# Patient Record
Sex: MALE | Race: WHITE | Employment: FULL TIME | URBAN - METROPOLITAN AREA
[De-identification: names, ages, dates, MRNs, and addresses within clinical notes are randomized per-mention and may not be internally consistent; named-entity substitution may affect disease eponyms.]

---

## 2017-03-06 ENCOUNTER — OFFICE VISIT (OUTPATIENT)
Dept: ENDOCRINOLOGY | Age: 62
End: 2017-03-06

## 2017-03-06 VITALS
WEIGHT: 174 LBS | DIASTOLIC BLOOD PRESSURE: 62 MMHG | BODY MASS INDEX: 24.91 KG/M2 | HEART RATE: 53 BPM | SYSTOLIC BLOOD PRESSURE: 104 MMHG | HEIGHT: 70 IN

## 2017-03-06 DIAGNOSIS — E11.9 TYPE 2 DIABETES MELLITUS WITHOUT COMPLICATION, WITHOUT LONG-TERM CURRENT USE OF INSULIN (HCC): Primary | ICD-10-CM

## 2017-03-06 DIAGNOSIS — E78.2 MIXED HYPERLIPIDEMIA: ICD-10-CM

## 2017-03-06 RX ORDER — OSELTAMIVIR PHOSPHATE 75 MG/1
CAPSULE ORAL
Refills: 0 | COMMUNITY
Start: 2017-02-07 | End: 2017-03-06 | Stop reason: ALTCHOICE

## 2017-03-06 NOTE — PROGRESS NOTES
Chief Complaint   Patient presents with    Diabetes     pcp and pharmacy verified. Release signed for eye exam     History of Present Illness: Darlene Vargas is a 57 y.o. male here for follow up of diabetes. He was diagnosed with diabetes around 2009. At his last appointment in September 2016 his A1C was 6.5% on Januvia 100 mg daily Metformin 500mg with breakfast and lunch 1000mg with dinner. He notes he has been taking his Lipitor with his Metformin at night. Pt notes his BGs are running in the  range. Pt is eating 2-3 meals daily. Pt has breakfast on the weekend only typically a breakfast sandwich. He has lunch around [place], today he had a tossed salad and water. Dinner last night was pork tenerloin, tossed salad, cottage cheese and potatoes and sweet tea. He denies snacking between meals or evening snacking. His last exam was in July 2016, no retinopathy (report reviewed, see scanned document). He has an appointment in the near future. Exercise consists of housework. No history of vascular disease. No history of retinopathy, neuropathy or nephropathy. Pt is tolerating the Atorvastatin 10mg daily (which was started in October of 2014) with no issues. Pt notes he had the flu about 3 weeks ago. Current Outpatient Prescriptions   Medication Sig    sitaGLIPtin (JANUVIA) 100 mg tablet TAKE 1 TABLET BY MOUTH DAILY. TO HELP LOWER BLOOD SUGAR    metFORMIN (GLUCOPHAGE) 500 mg tablet TAKE ONE TABLET WITH BREAKFAST, ONE TABLET WITH LUNCH AND TWO TABLETS WITH DINNER INDICATIONS: TYPE 2 DIABETES MELLITUS    atorvastatin (LIPITOR) 10 mg tablet TAKE 1 TABLET BY MOUTH DAILY. IN THE EVENING TO HELP LOWER CHOLESTEROL     No current facility-administered medications for this visit.       Allergies   Allergen Reactions    Codeine Other (comments)     Sweating     Review of Systems:  - Eyes: no blurry vision or double vision  - Cardiovascular: no chest pain  - Respiratory: no shortness of breath  - Musculoskeletal: no myalgias  - Neurological: no numbness/tingling in extremities    Physical Examination:  Blood pressure 104/62, pulse (!) 53, height 5' 10\" (1.778 m), weight 174 lb (78.9 kg). - General: pleasant, no distress, good eye contact   - Neck: no carotid bruits  - Cardiovascular: regular, normal rate, nl s1 and s2, no m/r/g, 2+ DP pulses   - Respiratory: clear bilaterally  - Integumentary: no edema, no foot ulcers, sensation to monofilament and vibration intact bilaterally  - Psychiatric: normal mood and affect    Data Reviewed:   - none new for review    Assessment/Plan:   1) DM > His blood sugars are doing very well, ranging between 's. Pt encouraged to keep up the good work. Will check his A1C today. His BP is at goal on no medications. 2) HLD > Pt is tolerating his atorvastatin 10mg well. Will check a lipid panel today. Pt voices understanding and agreement with the plan. RTC 3 months      Follow-up Disposition:  Return in about 6 months (around 9/6/2017).     Copy sent to:  Marisa Martínez

## 2017-03-06 NOTE — MR AVS SNAPSHOT
Visit Information Date & Time Provider Department Dept. Phone Encounter #  
 3/6/2017  4:10 PM Pascual Weeks, [address] [facility] Diabetes and Endocrinology [phone] [id number] Follow-up Instructions Return in about 6 months (around 9/6/2017). Upcoming Health Maintenance Date Due Hepatitis C Screening 1955 Pneumococcal 19-64 Medium Risk (1 of 1 - PPSV23) 6/29/1973 DTaP/Tdap/Td series (1 - Tdap) 6/29/1975 FOBT Q 1 YEAR AGE 50-75 6/29/2004 ZOSTER VACCINE AGE 60> 6/29/2014 EYE EXAM RETINAL OR DILATED Q1 3/27/2016 LIPID PANEL Q1 6/11/2016 INFLUENZA AGE 9 TO ADULT 8/1/2016 HEMOGLOBIN A1C Q6M 3/6/2017 MICROALBUMIN Q1 3/9/2017 FOOT EXAM Q1 9/6/2017 Allergies as of 3/6/2017  Review Complete On: 3/6/2017 By: Pascual Weeks MD  
  
 Severity Noted Reaction Type Reactions Codeine  02/22/2010    Other (comments) Sweating Current Immunizations  Never Reviewed No immunizations on file. Not reviewed this visit You Were Diagnosed With   
  
 Codes Comments Type 2 diabetes mellitus without complication, without long-term current use of insulin (HCC)    -  Primary ICD-10-CM: E11.9 ICD-9-CM: 250.00 Mixed hyperlipidemia     ICD-10-CM: E78.2 ICD-9-CM: 272.2 Vitals BP Pulse Height(growth percentile) Weight(growth percentile) BMI Smoking Status 104/62 (BP 1 Location: Left arm, BP Patient Position: Sitting) (!) 53 5' 10\" (1.778 m) 174 lb (78.9 kg) 24.97 kg/m2 Former Smoker Vitals History BMI and BSA Data Body Mass Index Body Surface Area 24.97 kg/m 2 1.97 m 2 Preferred Pharmacy Pharmacy Name Phone [facility] [phone] Your Updated Medication List  
  
   
This list is accurate as of: 3/6/17  4:29 PM.  Always use your most recent med list.  
  
  
  
  
 atorvastatin 10 mg tablet Commonly known as:  LIPITOR TAKE 1 TABLET BY MOUTH DAILY. IN THE EVENING TO HELP LOWER CHOLESTEROL  
  
 metFORMIN 500 mg tablet Commonly known as:  GLUCOPHAGE  
TAKE ONE TABLET WITH BREAKFAST, ONE TABLET WITH LUNCH AND TWO TABLETS WITH DINNER INDICATIONS: TYPE 2 DIABETES MELLITUS SITagliptin 100 mg tablet Commonly known as:  Carole Bannister TAKE 1 TABLET BY MOUTH DAILY. TO HELP LOWER BLOOD SUGAR We Performed the Following HEMOGLOBIN A1C WITH EAG [62615 CPT(R)] LDL, DIRECT [id number] CPT(R)] LIPID PANEL [75491 CPT(R)] METABOLIC PANEL, COMPREHENSIVE [12532 CPT(R)] MICROALBUMIN, UR, RAND W/ MICROALBUMIN/CREA RATIO [id number] CPT(R)] PA COLLECTION VENOUS BLOOD,VENIPUNCTURE [id number] CPT(R)] PA HANDLG&/OR CONVEY OF SPEC FOR TR OFFICE TO LAB [62895 CPT(R)] Follow-up Instructions Return in about 6 months (around 9/6/2017). Introducing hospitals & HEALTH SERVICES! Chantel Zaragoza introduces NoteVault patient portal. Now you can access parts of your medical record, email your doctor's office, and request medication refills online. 1. In your internet browser, go to [email or web address] 2. Click on the First Time User? Click Here link in the Sign In box. You will see the New Member Sign Up page. 3. Enter your NoteVault Access Code exactly as it appears below. You will not need to use this code after youve completed the sign-up process. If you do not sign up before the expiration date, you must request a new code. · NoteVault Access Code: [email or web address] Expires: 6/4/2017  4:02 PM 
 
4. Enter the last four digits of your Social Security Number (xxxx) and Date of Birth (mm/dd/yyyy) as indicated and click Submit. You will be taken to the next sign-up page. 5. Create a [email or web address].iot ID. This will be your NoteVault login ID and cannot be changed, so think of one that is secure and easy to remember. 6. Create a NoteVault password. You can change your password at any time. 7. Enter your Password Reset Question and Answer. This can be used at a later time if you forget your password. 8. Enter your e-mail address. You will receive e-mail notification when new information is available in [address] 9. Click Sign Up. You can now view and download portions of your medical record. 10. Click the Download Summary menu link to download a portable copy of your medical information. If you have questions, please visit the Frequently Asked Questions section of the Nimbuzz website. Remember, Nimbuzz is NOT to be used for urgent needs. For medical emergencies, dial 911. Now available from your iPhone and Android! Please provide this summary of care documentation to your next provider. Your primary care clinician is listed as Vero Hernadez. If you have any questions after today's visit, please call [phone].

## 2017-03-07 LAB
ALBUMIN SERPL-MCNC: 4.3 G/DL (ref 3.6–4.8)
ALBUMIN/CREAT UR: 3.4 MG/G CREAT (ref 0–30)
ALBUMIN/GLOB SERPL: 2 {RATIO} (ref 1.1–2.5)
ALP SERPL-CCNC: 63 IU/L (ref 39–117)
ALT SERPL-CCNC: 19 IU/L (ref 0–44)
AST SERPL-CCNC: 17 IU/L (ref 0–40)
BILIRUB SERPL-MCNC: 0.2 MG/DL (ref 0–1.2)
BUN SERPL-MCNC: 17 MG/DL (ref 8–27)
BUN/CREAT SERPL: 18 (ref 10–22)
CALCIUM SERPL-MCNC: 9.2 MG/DL (ref 8.6–10.2)
CHLORIDE SERPL-SCNC: 104 MMOL/L (ref 96–106)
CHOLEST SERPL-MCNC: 129 MG/DL (ref 100–199)
CO2 SERPL-SCNC: 26 MMOL/L (ref 18–29)
CREAT SERPL-MCNC: 0.93 MG/DL (ref 0.76–1.27)
CREAT UR-MCNC: 127.8 MG/DL
EST. AVERAGE GLUCOSE BLD GHB EST-MCNC: 143 MG/DL
GLOBULIN SER CALC-MCNC: 2.2 G/DL (ref 1.5–4.5)
GLUCOSE SERPL-MCNC: 121 MG/DL (ref 65–99)
HBA1C MFR BLD: 6.6 % (ref 4.8–5.6)
HDLC SERPL-MCNC: 40 MG/DL
INTERPRETATION, 910389: NORMAL
LDLC SERPL CALC-MCNC: 47 MG/DL (ref 0–99)
LDLC SERPL DIRECT ASSAY-MCNC: 72 MG/DL (ref 0–99)
Lab: NORMAL
MICROALBUMIN UR-MCNC: 4.3 UG/ML
POTASSIUM SERPL-SCNC: 4.4 MMOL/L (ref 3.5–5.2)
PROT SERPL-MCNC: 6.5 G/DL (ref 6–8.5)
SODIUM SERPL-SCNC: 144 MMOL/L (ref 134–144)
TRIGL SERPL-MCNC: 209 MG/DL (ref 0–149)
VLDLC SERPL CALC-MCNC: 42 MG/DL (ref 5–40)

## 2017-03-07 NOTE — PROGRESS NOTES
1) His A1C came back at 6.6%, which is very good. 2) His cholesterol levels are excellent. Keep taking the Lipitor 10mg daily. 3) His Liver and kidney function tests are very good  4) His urine test shows no evidence of protein in his urine, which is a good finding. Keep up the good work.

## 2017-03-10 ENCOUNTER — TELEPHONE (OUTPATIENT)
Dept: ENDOCRINOLOGY | Age: 62
End: 2017-03-10

## 2017-03-10 NOTE — TELEPHONE ENCOUNTER
Patient of Dr. Sergey Watters is requesting a call back in regards to his results. He can be reached at [phone]. Thank you.      Farheen Finch

## 2017-03-10 NOTE — TELEPHONE ENCOUNTER
I returned Perez Shanks call and gave him Dr. Elizabeth Read results from his chart. He was pleased to hear this.   Orlando Becker

## 2017-08-18 RX ORDER — METFORMIN HYDROCHLORIDE 500 MG/1
TABLET ORAL
Qty: 270 TAB | Refills: 3 | Status: SHIPPED | OUTPATIENT
Start: 2017-08-18 | End: 2018-07-01 | Stop reason: SDUPTHER

## 2017-09-05 ENCOUNTER — OFFICE VISIT (OUTPATIENT)
Dept: ENDOCRINOLOGY | Age: 62
End: 2017-09-05

## 2017-09-05 VITALS
HEIGHT: 70 IN | DIASTOLIC BLOOD PRESSURE: 61 MMHG | HEART RATE: 74 BPM | WEIGHT: 176.4 LBS | BODY MASS INDEX: 25.25 KG/M2 | SYSTOLIC BLOOD PRESSURE: 119 MMHG

## 2017-09-05 DIAGNOSIS — E11.9 TYPE 2 DIABETES MELLITUS WITHOUT COMPLICATION, WITHOUT LONG-TERM CURRENT USE OF INSULIN (HCC): Primary | ICD-10-CM

## 2017-09-05 DIAGNOSIS — E78.2 MIXED HYPERLIPIDEMIA: ICD-10-CM

## 2017-09-05 LAB — HBA1C MFR BLD HPLC: 6.6 %

## 2017-09-05 NOTE — PROGRESS NOTES
Chief Complaint   Patient presents with    Diabetes     pcp and pharmacy verified. Eye exam UTD     History of Present Illness: Richard Patel is a 60 y.o. male here for follow up of diabetes. He was diagnosed with diabetes around 2009. At his last appointment in March 2017 his A1C was 6.6% on Januvia 100 mg daily Metformin 500mg with breakfast and lunch 1000mg with dinner. His A1C today was 6.6%    Pt is still taking Januvia 100 mg daily Metformin 500mg with breakfast and lunch 1000mg with dinner. He notes he has been taking his Lipitor with his Metformin at night. He checks his BGs 3 times per week and his BGs are running in the 80-90 range. He denies any recent illnesses, injuries or hospitalizations. Pt is eating 2-3 meals daily. Pt has breakfast on the weekend only typically a breakfast sandwich. He has lunch around [place], today he had a tossed salad and water. Dinner last night was pork tenerloin, tossed salad, cottage cheese and potatoes and sweet tea. He denies snacking between meals or evening snacking. His last eye exam was in July 2017, no retinopathy (report reviewed, see scanned document). Exercise consists of housework. No history of vascular disease. No history of retinopathy, neuropathy or nephropathy. Pt is tolerating the Atorvastatin 10mg daily (which was started in October of 2014) with no issues. His lipid panel was at goal in March 2017. Current Outpatient Prescriptions   Medication Sig    metFORMIN (GLUCOPHAGE) 500 mg tablet TAKE ONE TABLET WITH BREAKFAST, ONE TABLET WITH LUNCH AND TWO TABLETS WITH DINNER    sitaGLIPtin (JANUVIA) 100 mg tablet TAKE 1 TABLET BY MOUTH DAILY. TO HELP LOWER BLOOD SUGAR    atorvastatin (LIPITOR) 10 mg tablet TAKE 1 TABLET BY MOUTH DAILY. IN THE EVENING TO HELP LOWER CHOLESTEROL     No current facility-administered medications for this visit.       Allergies   Allergen Reactions    Codeine Other (comments)     Sweating Review of Systems:  - Eyes: no blurry vision or double vision  - Cardiovascular: no chest pain  - Respiratory: no shortness of breath  - Musculoskeletal: no myalgias  - Neurological: no numbness/tingling in extremities    Physical Examination:  Blood pressure 119/61, pulse 74, height 5' 10\" (1.778 m), weight 176 lb 6.4 oz (80 kg). - General: pleasant, no distress, good eye contact   - Neck: no carotid bruits  - Cardiovascular: regular, normal rate, nl s1 and s2, no m/r/g, 2+ DP pulses   - Respiratory: clear bilaterally  - Integumentary: no edema, no foot ulcers, sensation to monofilament and vibration intact bilaterally  - Psychiatric: normal mood and affect    Data Reviewed:   - His A1C was 6.6%    Assessment/Plan:   1) DM > His blood sugars are doing very well, ranging between 80-90's. Pt encouraged to keep up the good work. His BP is at goal on no medications. 2) HLD > Pt is tolerating his atorvastatin 10mg well. His Lipid panel was at goal in March 2017. Pt voices understanding and agreement with the plan. RTC 6 months      Follow-up Disposition:  Return in about 6 months (around 3/5/2018).     Copy sent to:  Marivel Toledo

## 2017-09-05 NOTE — MR AVS SNAPSHOT
Visit Information Date & Time Provider Department Dept. Phone Encounter #  
 9/5/2017  4:10 PM Artemio Beyer [facility] Diabetes and Endocrinology [phone] [id number] Follow-up Instructions Return in about 6 months (around 3/5/2018). Your Appointments 9/5/2017  4:10 PM  
Follow Up with MD Isiah Solerton Diabetes and Endocrinology [facility] Appt Note: 6 month f/u Diabetes [address] [id number] [address] Upcoming Health Maintenance Date Due Hepatitis C Screening 1955 Pneumococcal 19-64 Medium Risk (1 of 1 - PPSV23) 6/29/1973 DTaP/Tdap/Td series (1 - Tdap) 6/29/1975 FOBT Q 1 YEAR AGE 50-75 6/29/2004 ZOSTER VACCINE AGE 60> 4/29/2014 INFLUENZA AGE 9 TO ADULT 8/1/2017 FOOT EXAM Q1 9/6/2017 HEMOGLOBIN A1C Q6M 9/6/2017 MICROALBUMIN Q1 3/6/2018 LIPID PANEL Q1 3/6/2018 EYE EXAM RETINAL OR DILATED Q1 7/7/2018 Allergies as of 9/5/2017  Review Complete On: 9/5/2017 By: Artemio Beyer MD  
  
 Severity Noted Reaction Type Reactions Codeine  02/22/2010    Other (comments) Sweating Current Immunizations  Never Reviewed No immunizations on file. Not reviewed this visit You Were Diagnosed With   
  
 Codes Comments Type 2 diabetes mellitus without complication, without long-term current use of insulin (HCC)    -  Primary ICD-10-CM: E11.9 ICD-9-CM: 250.00 Mixed hyperlipidemia     ICD-10-CM: E78.2 ICD-9-CM: 272.2 Vitals BP Pulse Height(growth percentile) Weight(growth percentile) BMI Smoking Status 119/61 74 5' 10\" (1.778 m) 176 lb 6.4 oz (80 kg) 25.31 kg/m2 Former Smoker Vitals History BMI and BSA Data Body Mass Index Body Surface Area  
 25.31 kg/m 2 1.99 m 2 [facility] [phone] Your Updated Medication List  
  
   
This list is accurate as of: 9/5/17  4:09 PM.  Always use your most recent med list.  
  
  
  
  
 atorvastatin 10 mg tablet Commonly known as:  LIPITOR  
TAKE 1 TABLET BY MOUTH DAILY. IN THE EVENING TO HELP LOWER CHOLESTEROL  
  
 metFORMIN 500 mg tablet Commonly known as:  GLUCOPHAGE  
TAKE ONE TABLET WITH BREAKFAST, ONE TABLET WITH LUNCH AND TWO TABLETS WITH DINNER SITagliptin 100 mg tablet Commonly known as:  [place] Vania TAKE 1 TABLET BY MOUTH DAILY. TO HELP LOWER BLOOD SUGAR We Performed the Following AMB POC HEMOGLOBIN A1C [31442 CPT(R)] HM DIABETES FOOT EXAM [HM7 Custom] Follow-up Instructions Return in about 6 months (around 3/5/2018). Patient Instructions Keep up the excellent work. Introducing [facility] & HEALTH SERVICES! Liam Ureña introduces iSSimple patient portal. Now you can access parts of your medical record, email your doctor's office, and request medication refills online. 1. In your internet browser, go to [email or web address] 2. Click on the First Time User? Click Here link in the Sign In box. You will see the New Member Sign Up page. 3. Enter your iSSimple Access Code exactly as it appears below. You will not need to use this code after youve completed the sign-up process. If you do not sign up before the expiration date, you must request a new code. · iSSimple Access Code: [email or web address] Expires: 12/4/2017  4:09 PM 
 
4. Enter the last four digits of your Social Security Number (xxxx) and Date of Birth (mm/dd/yyyy) as indicated and click Submit. You will be taken to the next sign-up page. 5. Create a iSSimple ID. This will be your iSSimple login ID and cannot be changed, so think of one that is secure and easy to remember. 6. Create a elmenus password. You can change your password at any time. 7. Enter your Password Reset Question and Answer. This can be used at a later time if you forget your password. 8. Enter your e-mail address. You will receive e-mail notification when new information is available in [address] 9. Click Sign Up. You can now view and download portions of your medical record. 10. Click the Download Summary menu link to download a portable copy of your medical information. If you have questions, please visit the Frequently Asked Questions section of the elmenus website. Remember, elmenus is NOT to be used for urgent needs. For medical emergencies, dial 911. Now available from your iPhone and Android! Please provide this summary of care documentation to your next provider. Your primary care clinician is listed as Edi Stacy. If you have any questions after today's visit, please call [phone].

## 2017-09-07 RX ORDER — BIOTIN 1 MG
TABLET ORAL
Qty: 50 STRIP | Status: SHIPPED | OUTPATIENT
Start: 2017-09-07 | End: 2020-01-13

## 2018-03-14 ENCOUNTER — OFFICE VISIT (OUTPATIENT)
Dept: ENDOCRINOLOGY | Age: 63
End: 2018-03-14

## 2018-03-14 VITALS
DIASTOLIC BLOOD PRESSURE: 70 MMHG | HEIGHT: 70 IN | WEIGHT: 174 LBS | BODY MASS INDEX: 24.91 KG/M2 | SYSTOLIC BLOOD PRESSURE: 108 MMHG | HEART RATE: 80 BPM

## 2018-03-14 DIAGNOSIS — E78.2 MIXED HYPERLIPIDEMIA: ICD-10-CM

## 2018-03-14 DIAGNOSIS — E11.9 TYPE 2 DIABETES MELLITUS WITHOUT COMPLICATION, WITHOUT LONG-TERM CURRENT USE OF INSULIN (HCC): Primary | ICD-10-CM

## 2018-03-14 LAB — HBA1C MFR BLD HPLC: 6.4 %

## 2018-03-14 NOTE — MR AVS SNAPSHOT
[address] [facility] [address] [id number] [phone] Patient: Meño Poole MRN:  [id number] Visit Information Date & Time Provider Department Dept. Phone Encounter #  
 3/14/2018  4:10 PM Ismael Zambrano, [address] [place] [address] Diabetes and Endocrinology [phone] Follow-up Instructions Return in about 6 months (around 9/14/2018). Upcoming Health Maintenance Date Due Hepatitis C Screening 1955 Pneumococcal 19-64 Medium Risk (1 of 1 - PPSV23) 6/29/1973 DTaP/Tdap/Td series (1 - Tdap) 6/29/1975 FOBT Q 1 YEAR AGE 50-75 6/29/2004 ZOSTER VACCINE AGE 60> 4/29/2014 Influenza Age 5 to Adult 8/1/2017 HEMOGLOBIN A1C Q6M 3/5/2018 MICROALBUMIN Q1 3/6/2018 LIPID PANEL Q1 3/6/2018 EYE EXAM RETINAL OR DILATED Q1 7/7/2018 FOOT EXAM Q1 9/5/2018 Allergies as of 3/14/2018  Review Complete On: 3/14/2018 By: Ismael Zambrano MD  
  
 Severity Noted Reaction Type Reactions Codeine  02/22/2010    Other (comments) Sweating Current Immunizations  Never Reviewed No immunizations on file. Not reviewed this visit You Were Diagnosed With   
  
 Codes Comments Type 2 diabetes mellitus without complication, without long-term current use of insulin (HCC)    -  Primary ICD-10-CM: E11.9 ICD-9-CM: 250.00 Mixed hyperlipidemia     ICD-10-CM: E78.2 ICD-9-CM: 272.2 Vitals BP Pulse Height(growth percentile) Weight(growth percentile) BMI Smoking Status 108/70 80 5' 10\" (1.778 m) 174 lb (78.9 kg) 24.97 kg/m2 Former Smoker Vitals History BMI and BSA Data Body Mass Index Body Surface Area 24.97 kg/m 2 1.97 m 2 Preferred Pharmacy Pharmacy Name Phone [facility] [phone] Your Updated Medication List  
  
   
 This list is accurate as of 3/14/18  4:13 PM.  Always use your most recent med list.  
  
  
  
  
 atorvastatin 10 mg tablet Commonly known as:  LIPITOR  
TAKE 1 TABLET EVERY EVENING TO HELP LOWER CHOLESTEROL  
  
 JANUVIA 100 mg tablet Generic drug:  SITagliptin TAKE 1 TABLET BY MOUTH DAILY. TO HELP LOWER BLOOD SUGAR  
  
 metFORMIN 500 mg tablet Commonly known as:  GLUCOPHAGE  
TAKE ONE TABLET WITH BREAKFAST, ONE TABLET WITH LUNCH AND TWO TABLETS WITH DINNER  
  
 TRUETEST TEST STRIPS strip Generic drug:  glucose blood VI test strips TEST BLOOD SUGAR AND RECORD RESULTS IN NOTEBOOK FOR Destinee rivas We Performed the Following AMB POC HEMOGLOBIN A1C [00717 CPT(R)] HM DIABETES FOOT EXAM [HM7 Custom] MICROALBUMIN, UR, RAND W/ MICROALB/CREAT RATIO [id number] CPT(R)] Follow-up Instructions Return in about 6 months (around 9/14/2018). Introducing [facility] & HEALTH SERVICES! [facility] introduces Tuolar.[email or web address] patient portal. Now you can access parts of your medical record, email your doctor's office, and request medication refills online. 1. In your internet browser, go to [email or web address] 2. Click on the First Time User? Click Here link in the Sign In box. You will see the New Member Sign Up page. 3. Enter your Tuolar.com Access Code exactly as it appears below. You will not need to use this code after youve completed the sign-up process. If you do not sign up before the expiration date, you must request a new code. · [email or web address].com Access Code: [email or web address] Expires: 6/12/2018  4:13 PM 
 
4. Enter the last four digits of your Social Security Number (xxxx) and Date of Birth (mm/dd/yyyy) as indicated and click Submit. You will be taken to the next sign-up page. 5. Create a Tuolar.com ID. This will be your Tuolar.com login ID and cannot be changed, so think of one that is secure and easy to remember. 6. Create a Avante Logixx password. You can change your password at any time. 7. Enter your Password Reset Question and Answer. This can be used at a later time if you forget your password. 8. Enter your e-mail address. You will receive e-mail notification when new information is available in [address] 9. Click Sign Up. You can now view and download portions of your medical record. 10. Click the Download Summary menu link to download a portable copy of your medical information. If you have questions, please visit the Frequently Asked Questions section of the Avante Logixx website. Remember, Avante Logixx is NOT to be used for urgent needs. For medical emergencies, dial 911. Now available from your iPhone and Android! Please provide this summary of care documentation to your next provider. Your primary care clinician is listed as Daniel Oms. If you have any questions after today's visit, please call [phone].

## 2018-03-14 NOTE — PROGRESS NOTES
Chief Complaint   Patient presents with    Diabetes     pcp and pharmacy verified. Eye exam UTD     History of Present Illness: Akiko Olson is a 60 y.o. male here for follow up of diabetes. He was diagnosed with diabetes around 2009. At his last appointment in September 2017 his A1C was 6.6% on Januvia 100 mg daily Metformin 500mg with breakfast and lunch 1000mg with dinner. His A1C today was 6.4%. Pt is still taking Januvia 100 mg daily Metformin 500mg with breakfast and lunch 1000mg with dinner. He notes he has been taking his Lipitor with his Metformin at night. He checks his BGs 3 times per week and his BGs are running in the 80-90 range. He denies any recent illnesses, injuries or hospitalizations. Pt is eating 2-3 meals daily. Pt has breakfast on the weekend only typically a breakfast sandwich. He has lunch around [place], today he had a tossed salad and water. Dinner last night was pork tenerloin, tossed salad, cottage cheese and potatoes and sweet tea. He denies snacking between meals or evening snacking. His last eye exam was in July 2017, no retinopathy (report reviewed, see scanned document). Exercise consists of housework. No history of vascular disease. No history of retinopathy, neuropathy or nephropathy. Pt is tolerating the Atorvastatin 10mg daily (which was started in October of 2014) with no issues. His lipid panel was at goal in March 2017. Current Outpatient Prescriptions   Medication Sig    JANUVIA 100 mg tablet TAKE 1 TABLET BY MOUTH DAILY. TO HELP LOWER BLOOD SUGAR    atorvastatin (LIPITOR) 10 mg tablet TAKE 1 TABLET EVERY EVENING TO HELP LOWER CHOLESTEROL    TRUETEST TEST STRIPS strip TEST BLOOD SUGAR AND RECORD RESULTS IN NOTEBOOK FOR DOCTOR    metFORMIN (GLUCOPHAGE) 500 mg tablet TAKE ONE TABLET WITH BREAKFAST, ONE TABLET WITH LUNCH AND TWO TABLETS WITH DINNER     No current facility-administered medications for this visit.       Allergies Allergen Reactions    Codeine Other (comments)     Sweating     Review of Systems:  - Eyes: no blurry vision or double vision  - Cardiovascular: no chest pain  - Respiratory: no shortness of breath  - Musculoskeletal: no myalgias  - Neurological: no numbness/tingling in extremities    Physical Examination:  Blood pressure 108/70, pulse 80, height 5' 10\" (1.778 m), weight 174 lb (78.9 kg). - General: pleasant, no distress, good eye contact   - Neck: no carotid bruits  - Cardiovascular: regular, normal rate, nl s1 and s2, no m/r/g, 2+ DP pulses   - Respiratory: clear bilaterally  - Integumentary: no edema, no foot ulcers, sensation to monofilament and vibration intact bilaterally  - Psychiatric: normal mood and affect    Data Reviewed:   - His A1C was 6.4%    Assessment/Plan:   1) DM > His blood sugars are doing very well, ranging between 80-90's. Pt encouraged to keep up the good work. His BP is at goal on no medications. Will check a urine MA today. 2) HLD > Pt is tolerating his atorvastatin 10mg well. His Lipid panel was at goal in March 2017. Will check a lipid panel before our next visit. Pt voices understanding and agreement with the plan. RTC 6 months      Follow-up Disposition:  Return in about 6 months (around 9/14/2018).     Copy sent to:  Shawnee Brar

## 2018-03-15 LAB
ALBUMIN/CREAT UR: 3.9 MG/G CREAT (ref 0–30)
CREAT UR-MCNC: 180 MG/DL
MICROALBUMIN UR-MCNC: 7 UG/ML

## 2018-06-16 ENCOUNTER — TELEPHONE (OUTPATIENT)
Dept: ENDOCRINOLOGY | Age: 63
End: 2018-06-16

## 2018-06-16 NOTE — TELEPHONE ENCOUNTER
Received call from pt. He notes he called to refill his Januvia and was told by the pharmacist that he would need prior auth from the doctor, because his insurance was changed. I renewed his prescription and told pt that if that was not sufficient, we would need to wait till Monday to contact his insurance.

## 2018-06-18 ENCOUNTER — DOCUMENTATION ONLY (OUTPATIENT)
Dept: ENDOCRINOLOGY | Age: 63
End: 2018-06-18

## 2018-06-18 NOTE — PROGRESS NOTES
Per Cover My Meds:   Your request has been approved   CaseId:[id number];Status:Approved; Review Type:Prior Auth; Coverage Start Date:06/18/2018; Coverage End Date:06/18/2019. Copay assistance card has been faxed to [phone].

## 2018-06-18 NOTE — TELEPHONE ENCOUNTER
Yudith Hargrove has been approved. Notified patient and pharmacist. Faxed a copay assistance card to pharmacy.

## 2018-07-02 RX ORDER — METFORMIN HYDROCHLORIDE 500 MG/1
TABLET ORAL
Qty: 270 TAB | Refills: 3 | Status: SHIPPED | OUTPATIENT
Start: 2018-07-02 | End: 2019-03-07

## 2018-09-05 ENCOUNTER — OFFICE VISIT (OUTPATIENT)
Dept: ENDOCRINOLOGY | Age: 63
End: 2018-09-05

## 2018-09-05 VITALS
SYSTOLIC BLOOD PRESSURE: 112 MMHG | BODY MASS INDEX: 25.22 KG/M2 | HEIGHT: 70 IN | WEIGHT: 176.2 LBS | DIASTOLIC BLOOD PRESSURE: 73 MMHG | HEART RATE: 65 BPM

## 2018-09-05 DIAGNOSIS — E11.9 TYPE 2 DIABETES MELLITUS WITHOUT COMPLICATION, WITHOUT LONG-TERM CURRENT USE OF INSULIN (HCC): Primary | ICD-10-CM

## 2018-09-05 DIAGNOSIS — E78.2 MIXED HYPERLIPIDEMIA: ICD-10-CM

## 2018-09-05 RX ORDER — METFORMIN HYDROCHLORIDE 500 MG/1
TABLET, EXTENDED RELEASE ORAL
Qty: 120 TAB | Refills: 11 | Status: SHIPPED | OUTPATIENT
Start: 2018-09-05 | End: 2019-10-07 | Stop reason: SDUPTHER

## 2018-09-05 NOTE — PROGRESS NOTES
Chief Complaint Patient presents with  Diabetes  
  pcp and pharmacy verified. Eye exam appt tomorrow History of Present Illness: Shawna Saenz is a 58 y.o. male here for follow up of diabetes. He was diagnosed with diabetes around 2009. At his last appointment in March 2018 his A1C was 6.4% on Januvia 100 mg daily Metformin 500mg with breakfast and lunch 1000mg with dinner. Pt was encouraged to keep up the excellent work. Pt is still taking Januvia 100 mg daily Metformin 500mg with breakfast and lunch 1000mg with dinner. Pt notes he has been having issues of severe body odor when when he sweats and he notes that when he holds the Metformin the smell improves. He notes the smell is the same as the Metformin pill. He notes he has been taking his Lipitor with his Metformin at night. He checks his BGs 3 times per week and his BGs are running in the  range. He denies any recent illnesses, injuries or hospitalizations. Pt is eating 2-3 meals daily. Pt has breakfast on the weekend only typically a breakfast sandwich. He has lunch around [place], today he had a slice of pizza and water. Dinner last night was baked chicken wings, pork and beans and string bean salad. He denies snacking between meals or evening snacking. His last eye exam was in July 2017, no retinopathy. He has an appointment for follow up tomorrow. Exercise consists of housework. No history of vascular disease. No history of retinopathy, neuropathy or nephropathy. Pt is tolerating the Atorvastatin 10mg daily (which was started in October of 2014) with no issues. His lipid panel was at goal in March 2017. Current Outpatient Prescriptions Medication Sig  
 metFORMIN (GLUCOPHAGE) 500 mg tablet TAKE ONE TABLET WITH BREAKFAST, ONE TABLET WITH LUNCH AND TWO TABLETS WITH DINNER  
 SITagliptin (JANUVIA) 100 mg tablet TAKE 1 TABLET BY MOUTH DAILY.  TO HELP LOWER BLOOD SUGAR  
  atorvastatin (LIPITOR) 10 mg tablet TAKE 1 TABLET EVERY EVENING TO HELP LOWER CHOLESTEROL  TRUETEST TEST STRIPS strip TEST BLOOD SUGAR AND RECORD RESULTS IN NOTEBOOK FOR [place] No current facility-administered medications for this visit. Allergies Allergen Reactions  Codeine Other (comments) Sweating Review of Systems: - Eyes: no blurry vision or double vision - Cardiovascular: no chest pain - Respiratory: no shortness of breath - Musculoskeletal: no myalgias - Neurological: no numbness/tingling in extremities Physical Examination: 
Blood pressure 112/73, pulse 65, height 5' 10\" (1.778 m), weight 176 lb 3.2 oz (79.9 kg). - General: pleasant, no distress, good eye contact  
- Neck: no carotid bruits - Cardiovascular: regular, normal rate, nl s1 and s2, no m/r/g, 2+ DP pulses - Respiratory: clear bilaterally - Integumentary: no edema, no foot ulcers,  
- Psychiatric: normal mood and affect Diabetic foot exam:  
 
Left Foot: 
 Visual Exam: normal  
 Pulse DP: 2+ (normal) Filament test: normal sensation Vibratory sensation: normal 
   
Right Foot: 
 Visual Exam: normal  
 Pulse DP: 2+ (normal) Filament test: normal sensation Vibratory sensation: normal 
 
 
Data Reviewed:  
- None Assessment/Plan:  
1) DM > His blood sugars are doing very well, ranging between 's. Pt encouraged to keep up the good work. His BP is at goal on no medications. Will check an A1C today. 2) HLD > Pt is tolerating his atorvastatin 10mg well. His Lipid panel was at goal in March 2017. Will check a lipid panel before our next visit. Pt voices understanding and agreement with the plan. RTC 6 months Follow-up Disposition: 
Return in about 6 months (around 3/5/2019). Copy sent to: 
Jill Fernandes

## 2018-09-06 LAB
EST. AVERAGE GLUCOSE BLD GHB EST-MCNC: 151 MG/DL
HBA1C MFR BLD: 6.9 % (ref 4.8–5.6)

## 2018-10-08 RX ORDER — ATORVASTATIN CALCIUM 10 MG/1
TABLET, FILM COATED ORAL
Qty: 90 TAB | Refills: 3 | Status: SHIPPED | OUTPATIENT
Start: 2018-10-08 | End: 2019-10-17 | Stop reason: SDUPTHER

## 2019-03-07 ENCOUNTER — OFFICE VISIT (OUTPATIENT)
Dept: ENDOCRINOLOGY | Age: 64
End: 2019-03-07

## 2019-03-07 VITALS
HEART RATE: 69 BPM | WEIGHT: 174.8 LBS | DIASTOLIC BLOOD PRESSURE: 61 MMHG | SYSTOLIC BLOOD PRESSURE: 107 MMHG | BODY MASS INDEX: 25.03 KG/M2 | HEIGHT: 70 IN

## 2019-03-07 DIAGNOSIS — E11.9 TYPE 2 DIABETES MELLITUS WITHOUT COMPLICATION, WITHOUT LONG-TERM CURRENT USE OF INSULIN (HCC): Primary | ICD-10-CM

## 2019-03-07 DIAGNOSIS — E78.2 MIXED HYPERLIPIDEMIA: ICD-10-CM

## 2019-03-07 NOTE — PROGRESS NOTES
Chief Complaint   Patient presents with    Diabetes     pcp and pharmacy verified. Eye exam:     History of Present Illness: Jenniffer Foster is a 58 y.o. male here for follow up of diabetes. He was diagnosed with diabetes around 2009. At his last appointment in September 2018 his A1C was 6.9% on Januvia 100 mg daily, Metformin 500mg with breakfast and lunch 1000mg with dinner. Pt was encouraged to keep up the excellent work. Pt is still taking Januvia 100 mg daily Metformin XR 500mg with breakfast and lunch 1000mg with dinner. Pt notes that when we changed the Metformin to Metformin XR the body odor has stopped. He notes he has been taking his Lipitor with his Metformin at night. He checks his BGs 3 times per week and his BGs are running in the  range. He denies any recent illnesses, injuries or hospitalizations. Pt is eating 2-3 meals daily. Pt has breakfast on the weekend only typically a breakfast sandwich. He has lunch around [place], today he had cheese and olives and a bottle of water. Dinner last night was [employer], cabbages and fried peaches. He denies snacking between meals or evening snacking. His last eye exam was in September 2018, no retinopathy. (report reviewed)    No history of vascular disease. No history of neuropathy or nephropathy. Pt is tolerating the Atorvastatin 10mg daily, with no issues. Current Outpatient Medications   Medication Sig    atorvastatin (LIPITOR) 10 mg tablet TAKE 1 TABLET EVERY EVENING TO HELP LOWER CHOLESTEROL    metFORMIN ER (GLUCOPHAGE XR) 500 mg tablet One with breakfast, one with lunch and two with dinner    SITagliptin (JANUVIA) 100 mg tablet TAKE 1 TABLET BY MOUTH DAILY. TO HELP LOWER BLOOD SUGAR    TRUETEST TEST STRIPS strip TEST BLOOD SUGAR AND RECORD RESULTS IN NOTEBOOK FOR DOCTOR     No current facility-administered medications for this visit.       Allergies   Allergen Reactions    Codeine Other (comments)     Sweating     Review of Systems:  - Eyes: no blurry vision or double vision  - Cardiovascular: no chest pain  - Respiratory: no shortness of breath  - Musculoskeletal: no myalgias  - Neurological: no numbness/tingling in extremities    Physical Examination:  Blood pressure 107/61, pulse 69, height 5' 10\" (1.778 m), weight 174 lb 12.8 oz (79.3 kg). - General: pleasant, no distress, good eye contact   - Neck: no carotid bruits  - Cardiovascular: regular, normal rate, nl s1 and s2, no m/r/g, 2+ DP pulses   - Respiratory: clear bilaterally  - Integumentary: no edema, no foot ulcers,   - Psychiatric: normal mood and affect    Diabetic foot exam:     Left Foot:   Visual Exam: normal    Pulse DP: 2+ (normal)   Filament test: normal sensation    Vibratory sensation: normal      Right Foot:   Visual Exam: normal    Pulse DP: 2+ (normal)   Filament test: normal sensation    Vibratory sensation: normal      Data Reviewed:   - None    Assessment/Plan:   1) DM > His blood sugars are doing very well, ranging between 's. Pt encouraged to keep up the good work. His BP is at goal on no medications. Will check an A1C and Urine MA today. 2) HLD > Pt is tolerating his atorvastatin 10mg well. Will check a lipid panel and CMP today. Pt voices understanding and agreement with the plan. RTC 6 months      Follow-up Disposition:  Return in about 6 months (around 9/7/2019).     Copy sent to:  Joesph Odom

## 2019-03-08 LAB
ALBUMIN SERPL-MCNC: 4.4 G/DL (ref 3.6–4.8)
ALBUMIN/CREAT UR: <5.7 MG/G CREAT (ref 0–30)
ALBUMIN/GLOB SERPL: 2.2 {RATIO} (ref 1.2–2.2)
ALP SERPL-CCNC: 61 IU/L (ref 39–117)
ALT SERPL-CCNC: 15 IU/L (ref 0–44)
AST SERPL-CCNC: 17 IU/L (ref 0–40)
BILIRUB SERPL-MCNC: 0.6 MG/DL (ref 0–1.2)
BUN SERPL-MCNC: 18 MG/DL (ref 8–27)
BUN/CREAT SERPL: 20 (ref 10–24)
CALCIUM SERPL-MCNC: 9.4 MG/DL (ref 8.6–10.2)
CHLORIDE SERPL-SCNC: 105 MMOL/L (ref 96–106)
CHOLEST SERPL-MCNC: 119 MG/DL (ref 100–199)
CO2 SERPL-SCNC: 25 MMOL/L (ref 20–29)
CREAT SERPL-MCNC: 0.92 MG/DL (ref 0.76–1.27)
CREAT UR-MCNC: 52.6 MG/DL
EST. AVERAGE GLUCOSE BLD GHB EST-MCNC: 151 MG/DL
GLOBULIN SER CALC-MCNC: 2 G/DL (ref 1.5–4.5)
GLUCOSE SERPL-MCNC: 93 MG/DL (ref 65–99)
HBA1C MFR BLD: 6.9 % (ref 4.8–5.6)
HDLC SERPL-MCNC: 42 MG/DL
INTERPRETATION, 910389: NORMAL
LDLC SERPL CALC-MCNC: 63 MG/DL (ref 0–99)
Lab: NORMAL
MICROALBUMIN UR-MCNC: <3 UG/ML
POTASSIUM SERPL-SCNC: 4.3 MMOL/L (ref 3.5–5.2)
PROT SERPL-MCNC: 6.4 G/DL (ref 6–8.5)
SODIUM SERPL-SCNC: 142 MMOL/L (ref 134–144)
TRIGL SERPL-MCNC: 68 MG/DL (ref 0–149)
VIT B12 SERPL-MCNC: <150 PG/ML (ref 232–1245)
VLDLC SERPL CALC-MCNC: 14 MG/DL (ref 5–40)

## 2019-03-08 RX ORDER — LANOLIN ALCOHOL/MO/W.PET/CERES
1000 CREAM (GRAM) TOPICAL DAILY
Qty: 90 TAB | Refills: 3 | Status: SHIPPED | OUTPATIENT
Start: 2019-03-08 | End: 2020-03-11

## 2019-09-09 ENCOUNTER — OFFICE VISIT (OUTPATIENT)
Dept: ENDOCRINOLOGY | Age: 64
End: 2019-09-09

## 2019-09-09 VITALS
BODY MASS INDEX: 24.68 KG/M2 | HEIGHT: 70 IN | DIASTOLIC BLOOD PRESSURE: 62 MMHG | HEART RATE: 69 BPM | WEIGHT: 172.4 LBS | SYSTOLIC BLOOD PRESSURE: 95 MMHG

## 2019-09-09 DIAGNOSIS — E78.2 MIXED HYPERLIPIDEMIA: ICD-10-CM

## 2019-09-09 DIAGNOSIS — E11.9 TYPE 2 DIABETES MELLITUS WITHOUT COMPLICATION, WITHOUT LONG-TERM CURRENT USE OF INSULIN (HCC): Primary | ICD-10-CM

## 2019-09-09 LAB — HBA1C MFR BLD HPLC: 6.7 %

## 2019-09-09 NOTE — PROGRESS NOTES
Chief Complaint   Patient presents with    Diabetes     pcp and pharmacy verified. Eye exam: appt next month     History of Present Illness: Lakhwinder Rosas is a 71 y.o. male here for follow up of diabetes. He was diagnosed with diabetes around 2009. His A1C today was 6.7%    Pt is still taking Januvia 100 mg daily Metformin XR 500mg with breakfast and lunch 1000mg with dinner. He notes he has been taking his Lipitor with his Metformin at night. He checks his BGs 3 times per week and his BGs are running in the  range. He denies any recent illnesses, injuries or hospitalizations. Pt is eating 2-3 meals daily. Pt has breakfast on the weekend only typically a breakfast sandwich. He has lunch around [place], today he had chicken nuggets, mac and cheese and a tossed salad and water. Dinner last night was two chicken legs, 1/2 a sweet potato and bess greens. He denies snacking between meals or evening snacking. His last eye exam was in September 2018, no retinopathy. (report reviewed) He has an appointment on 10/7/19. No history of vascular disease. No history of neuropathy or nephropathy. Pt is tolerating the Atorvastatin 10mg daily, with no issues. Current Outpatient Medications   Medication Sig    SITagliptin (JANUVIA) 100 mg tablet TAKE 1 TABLET BY MOUTH DAILY. TO HELP LOWER BLOOD SUGAR    cyanocobalamin 1,000 mcg tablet Take 1 Tab by mouth daily.  atorvastatin (LIPITOR) 10 mg tablet TAKE 1 TABLET EVERY EVENING TO HELP LOWER CHOLESTEROL    metFORMIN ER (GLUCOPHAGE XR) 500 mg tablet One with breakfast, one with lunch and two with dinner    TRUETEST TEST STRIPS strip TEST BLOOD SUGAR AND RECORD RESULTS IN NOTEBOOK FOR DOCTOR     No current facility-administered medications for this visit.       Allergies   Allergen Reactions    Codeine Other (comments)     Sweating     Review of Systems:  - Eyes: no blurry vision or double vision  - Cardiovascular: no chest pain  - Respiratory: no shortness of breath  - Musculoskeletal: no myalgias  - Neurological: no numbness/tingling in extremities    Physical Examination:  Blood pressure 95/62, pulse 69, height 5' 10\" (1.778 m), weight 172 lb 6.4 oz (78.2 kg). - General: pleasant, no distress, good eye contact   - Neck: no carotid bruits  - Cardiovascular: regular, normal rate, nl s1 and s2, no m/r/g, 2+ DP pulses   - Respiratory: clear bilaterally  - Integumentary: no edema, no foot ulcers,   - Psychiatric: normal mood and affect    Diabetic foot exam:     Left Foot:   Visual Exam: normal    Pulse DP: 2+ (normal)   Filament test: normal sensation    Vibratory sensation: normal      Right Foot:   Visual Exam: normal    Pulse DP: 2+ (normal)   Filament test: normal sensation    Vibratory sensation: normal      Data Reviewed:   His A1C today was 6.7%    Assessment/Plan:   1) DM > His A1C today was 6.7%. Pt encouraged to keep up the good work. His BP is at goal on no medications. 2) HLD > Pt is tolerating his atorvastatin 10mg well. His Lipid panel was at goal in March 2019. Pt voices understanding and agreement with the plan. RTC 6 months      Follow-up and Dispositions    · Return in about 6 months (around 3/9/2020).          Copy sent to:  Mikael Ballesteros

## 2019-10-07 RX ORDER — METFORMIN HYDROCHLORIDE 500 MG/1
TABLET, EXTENDED RELEASE ORAL
Qty: 120 TAB | Refills: 11 | Status: SHIPPED | OUTPATIENT
Start: 2019-10-07 | End: 2020-10-28

## 2019-10-17 RX ORDER — ATORVASTATIN CALCIUM 10 MG/1
TABLET, FILM COATED ORAL
Qty: 90 TAB | Refills: 3 | Status: SHIPPED | OUTPATIENT
Start: 2019-10-17 | End: 2020-03-12 | Stop reason: SDUPTHER

## 2020-03-09 DIAGNOSIS — E11.9 TYPE 2 DIABETES MELLITUS WITHOUT COMPLICATION, WITHOUT LONG-TERM CURRENT USE OF INSULIN (HCC): ICD-10-CM

## 2020-03-09 DIAGNOSIS — E78.2 MIXED HYPERLIPIDEMIA: ICD-10-CM

## 2020-03-11 RX ORDER — LANOLIN ALCOHOL/MO/W.PET/CERES
CREAM (GRAM) TOPICAL
Qty: 100 TAB | Refills: 2 | Status: SHIPPED | OUTPATIENT
Start: 2020-03-11 | End: 2020-09-04

## 2020-03-12 ENCOUNTER — OFFICE VISIT (OUTPATIENT)
Dept: ENDOCRINOLOGY | Age: 65
End: 2020-03-12

## 2020-03-12 VITALS
DIASTOLIC BLOOD PRESSURE: 69 MMHG | BODY MASS INDEX: 24.82 KG/M2 | HEIGHT: 70 IN | WEIGHT: 173.4 LBS | HEART RATE: 71 BPM | SYSTOLIC BLOOD PRESSURE: 119 MMHG

## 2020-03-12 DIAGNOSIS — E11.9 TYPE 2 DIABETES MELLITUS WITHOUT COMPLICATION, WITHOUT LONG-TERM CURRENT USE OF INSULIN (HCC): Primary | ICD-10-CM

## 2020-03-12 DIAGNOSIS — E78.2 MIXED HYPERLIPIDEMIA: ICD-10-CM

## 2020-03-12 LAB — HBA1C MFR BLD HPLC: 6.8 %

## 2020-03-12 RX ORDER — ATORVASTATIN CALCIUM 10 MG/1
TABLET, FILM COATED ORAL
Qty: 90 TAB | Refills: 3 | Status: SHIPPED | OUTPATIENT
Start: 2020-03-12 | End: 2021-02-22

## 2020-03-12 NOTE — PROGRESS NOTES
Chief Complaint   Patient presents with    Diabetes     pcp and pharmacy verified. Eye exam UTD     History of Present Illness: Skylar Payton is a 71 y.o. male here for follow up of diabetes. He was diagnosed with diabetes around 2009. At our last visit in September 2019 his A1C was 6.7% on Januvia 100mg daily and Metformin XR 500mg with breakfast and lunch 1000mg with dinner. His A1C today was 6.8%    Pt is still taking Januvia 100 mg daily and Metformin XR 500mg with breakfast, 500mg with lunch and 1000mg with dinner. He notes he has been taking his Lipitor with his Metformin at night. He checks his BGs 3 times per week and his BGs are running in the 80-90 range. He denies any recent illnesses, injuries or hospitalizations. Pt is eating 2-3 meals daily. Pt has breakfast on the weekend only typically a breakfast sandwich. He has lunch around [place], yesterday he had spaghetti. Dinner last night was roast beef with potatoes, carrots and greens. He denies snacking between meals or evening snacking. His last eye exam was in October 2019, no retinopathy. (report reviewed). No history of vascular disease. No history of neuropathy or nephropathy. Pt is tolerating the Atorvastatin 10mg daily, with no issues. Current Outpatient Medications   Medication Sig    cyanocobalamin 1,000 mcg tablet DISSOLVE 1 TAB IN  MOUTH DAILY.  glucose blood VI test strips (TRUETEST TEST STRIPS) strip TEST BLOOD SUGAR AND RECORD RESULTS IN NOTEBOOK FOR DOCTOR    atorvastatin (LIPITOR) 10 mg tablet TAKE 1 TABLET EVERY EVENING TO HELP LOWER CHOLESTEROL    metFORMIN ER (GLUCOPHAGE XR) 500 mg tablet TAKE 1 TABLET WITH BREAKFAST AND LUNCH AND TAKE 2 TABLETS WITH DINNER    SITagliptin (JANUVIA) 100 mg tablet TAKE 1 TABLET BY MOUTH DAILY. TO HELP LOWER BLOOD SUGAR     No current facility-administered medications for this visit.       Allergies   Allergen Reactions    Codeine Other (comments) Sweating     Review of Systems:  - Eyes: no blurry vision or double vision  - Cardiovascular: no chest pain  - Respiratory: no shortness of breath  - Musculoskeletal: no myalgias  - Neurological: no numbness/tingling in extremities    Physical Examination:  Blood pressure 119/69, pulse 71, height 5' 10\" (1.778 m), weight 173 lb 6.4 oz (78.7 kg). - General: pleasant, no distress, good eye contact   - Neck: no carotid bruits  - Cardiovascular: regular, normal rate, nl s1 and s2, no m/r/g, 2+ DP pulses   - Respiratory: clear bilaterally  - Integumentary: no edema, no foot ulcers,   - Psychiatric: normal mood and affect    Diabetic foot exam:     Left Foot:   Visual Exam: normal    Pulse DP: 2+ (normal)   Filament test: normal sensation    Vibratory sensation: normal      Right Foot:   Visual Exam: normal    Pulse DP: 2+ (normal)   Filament test: normal sensation    Vibratory sensation: normal      Data Reviewed:   His A1C today was 6.8%    Assessment/Plan:   1) DM > His A1C today was 6.8%. Pt encouraged to keep up the good work. His BP is at goal on no medications. 2) HLD > Pt is tolerating his atorvastatin 10mg well. His Lipid panel was at goal in March 2019. Will order a lipid panel and CMP prior to our next visit. Pt voices understanding and agreement with the plan. RTC 6 months      Follow-up and Dispositions    · Return in about 6 months (around 9/12/2020).          Copy sent to:  Valerie Garcia

## 2020-03-13 LAB
ALBUMIN/CREAT UR: 4 MG/G CREAT (ref 0–29)
CREAT UR-MCNC: 194.5 MG/DL
MICROALBUMIN UR-MCNC: 6.9 UG/ML

## 2020-09-10 ENCOUNTER — VIRTUAL VISIT (OUTPATIENT)
Dept: ENDOCRINOLOGY | Age: 65
End: 2020-09-10
Payer: COMMERCIAL

## 2020-09-10 DIAGNOSIS — E78.2 MIXED HYPERLIPIDEMIA: ICD-10-CM

## 2020-09-10 DIAGNOSIS — E11.9 TYPE 2 DIABETES MELLITUS WITHOUT COMPLICATION, WITHOUT LONG-TERM CURRENT USE OF INSULIN (HCC): Primary | ICD-10-CM

## 2020-09-10 PROCEDURE — 99214 OFFICE O/P EST MOD 30 MIN: CPT | Performed by: INTERNAL MEDICINE

## 2020-09-10 NOTE — PROGRESS NOTES
Chief Complaint   Patient presents with    Diabetes     pcp and pharmacy veried. Eye exam due. Doxy. Me            **THIS IS A VIRTUAL VISIT VIA A VIDEO ENCOUNTER. PATIENT AGREED TO HAVE THEIR CARE DELIVERED OVER VIDEO IN PLACE OF THEIR REGULARLY SCHEDULED OFFICE VISIT**      History of Present Illness: Avani Anderson is a 76 y.o. male here for follow up of diabetes. He was diagnosed with diabetes around 2009. At our last visit in March 2020 his A1C was 6.8% on Januvia 100mg daily and Metformin XR 500mg with breakfast and lunch 1000mg with dinner. Pt is still taking Januvia 100 mg daily and Metformin XR 500mg with breakfast, 500mg with lunch and 1000mg with dinner. He notes he has been taking his Lipitor with his Metformin at night. He checks his BGs 3 times per week and his BGs are running in the  range. He denies any recent illnesses, injuries or hospitalizations. Pt is eating 2-3 meals daily. Pt has breakfast on the weekend only typically a breakfast sandwich. He has lunch around [place], yesterday he had majo slaw, apple sauce and water. Pt has dinner around 730PM, last night he had a bowl of cereal.           He denies snacking between meals or evening snacking. His last eye exam was in October 2019, no retinopathy. (report reviewed). No history of vascular disease. No history of neuropathy or nephropathy. Pt is tolerating the Atorvastatin 10mg daily, with no issues. Current Outpatient Medications   Medication Sig    cyanocobalamin 1,000 mcg tablet DISSOLVE 1 TABLET IN MOUTH DAILY.  SITagliptin (Januvia) 100 mg tablet TAKE 1 TABLET BY MOUTH DAILY.  TO HELP LOWER BLOOD SUGAR    atorvastatin (LIPITOR) 10 mg tablet TAKE 1 TABLET EVERY EVENING TO HELP LOWER CHOLESTEROL    glucose blood VI test strips (TRUETEST TEST STRIPS) strip TEST BLOOD SUGAR AND RECORD RESULTS IN NOTEBOOK FOR DOCTOR    metFORMIN ER (GLUCOPHAGE XR) 500 mg tablet TAKE 1 TABLET WITH BREAKFAST AND LUNCH AND TAKE 2 TABLETS WITH DINNER     No current facility-administered medications for this visit. Allergies   Allergen Reactions    Codeine Other (comments)     Sweating     Review of Systems:  - Eyes: no blurry vision or double vision  - Cardiovascular: no chest pain  - Respiratory: no shortness of breath  - Musculoskeletal: no myalgias  - Neurological: no numbness/tingling in extremities    Physical Examination:  There were no vitals taken for this visit. - GENERAL: NCAT, Appears well nourished   - EYES: EOMI, non-icteric, no proptosis   - Ear/Nose/Throat: NCAT, no visible inflammation or masses   - CARDIOVASCULAR: no cyanosis, no visible JVD   - RESPIRATORY: respiratory effort normal without any distress or labored breathing   - MUSCULOSKELETAL: Normal ROM of neck and upper extremities observed   - SKIN: No rash on face   - NEUROLOGIC:  No facial asymmetry (Cranial nerve 7 motor function), No gaze palsy   - PSYCHIATRIC: Normal affect, Normal insight and judgement     Data Reviewed:   None    Assessment/Plan:   1) DM > Pt reports his BGs are running in a good range on the Januvia 100 mg daily and Metformin XR 500mg with breakfast, 500mg with lunch and 1000mg with dinner. Pt encouraged to keep up the good work. Will order an A1C  His BP is at goal on no medications. 2) HLD > Pt is tolerating his atorvastatin 10mg well. His Lipid panel was at goal in March 2019. Will order a lipid panel and CMP. Pt voices understanding and agreement with the plan.     RTC 6 months          Copy sent to:  Yenny Dickey

## 2020-09-12 DIAGNOSIS — E11.9 TYPE 2 DIABETES MELLITUS WITHOUT COMPLICATION, WITHOUT LONG-TERM CURRENT USE OF INSULIN (HCC): ICD-10-CM

## 2020-09-12 DIAGNOSIS — E78.2 MIXED HYPERLIPIDEMIA: ICD-10-CM

## 2020-09-29 ENCOUNTER — TELEPHONE (OUTPATIENT)
Dept: ENDOCRINOLOGY | Age: 65
End: 2020-09-29

## 2020-09-29 NOTE — TELEPHONE ENCOUNTER
----- Message from Damien Villagomez sent at 9/29/2020  9:50 AM EDT -----  Regarding: /Telephone  General Message/Vendor Calls    Caller's first and last name:Samson Alfredo      Reason for call:billing information sent to the wrong insurance company       Callback required yes/no and why:yes      Best contact number(s):[phone]      Details to clarify the request:Pt requesting to discuss that  billing information were sent to the wrong insurance company .       Damien Villagomez

## 2020-10-28 RX ORDER — METFORMIN HYDROCHLORIDE 500 MG/1
TABLET, EXTENDED RELEASE ORAL
Qty: 120 TAB | Refills: 10 | Status: SHIPPED | OUTPATIENT
Start: 2020-10-28 | End: 2021-11-08

## 2021-02-05 ENCOUNTER — VIRTUAL VISIT (OUTPATIENT)
Dept: ENDOCRINOLOGY | Age: 66
End: 2021-02-05
Payer: COMMERCIAL

## 2021-02-05 DIAGNOSIS — E78.2 MIXED HYPERLIPIDEMIA: ICD-10-CM

## 2021-02-05 DIAGNOSIS — E11.9 TYPE 2 DIABETES MELLITUS WITHOUT COMPLICATION, WITHOUT LONG-TERM CURRENT USE OF INSULIN (HCC): Primary | ICD-10-CM

## 2021-02-05 PROCEDURE — 99214 OFFICE O/P EST MOD 30 MIN: CPT | Performed by: INTERNAL MEDICINE

## 2021-02-05 NOTE — PROGRESS NOTES
Chief Complaint   Patient presents with    Diabetes     pcp and pharmacy verified. Eye exam: 2 weeks ago. DOXY. ME            **THIS IS A VIRTUAL VISIT VIA A VIDEO ENCOUNTER. PATIENT AGREED TO HAVE THEIR CARE DELIVERED OVER VIDEO IN PLACE OF THEIR REGULARLY SCHEDULED OFFICE VISIT**      History of Present Illness: Horace Azevedo is a 76 y.o. male here for follow up of diabetes. He was diagnosed with diabetes around 2009. Pt denies any recent illnesses, injuries or hospitalizations. Pt is still taking Januvia 100 mg daily and Metformin XR 500mg with breakfast, 500mg with lunch and 1000mg with dinner. He notes he has been taking his Lipitor with his Metformin at night. He checks his BGs 3 times per week and his BGs are running in the  range. Pt is eating 2-3 meals daily. Pt has breakfast on the weekend only typically a breakfast sandwich. He has lunch around Noon, yesterday he had majo slaw, apple sauce and water. Pt has dinner around 730PM, last night he had a bowl of cereal.           He denies snacking between meals or evening snacking. His last eye exam was in January 2021October 2019, no retinopathy. (report reviewed). No history of vascular disease. No history of neuropathy or nephropathy. Pt is tolerating the Atorvastatin 10mg daily, with no issues. Current Outpatient Medications   Medication Sig    metFORMIN ER (GLUCOPHAGE XR) 500 mg tablet TAKE 1 TABLET WITH BREAKFAST AND LUNCH AND TAKE 2 TABLETS WITH DINNER    SITagliptin (Januvia) 100 mg tablet TAKE 1 TABLET BY MOUTH DAILY. TO HELP LOWER BLOOD SUGAR    cyanocobalamin 1,000 mcg tablet DISSOLVE 1 TABLET IN MOUTH DAILY.  atorvastatin (LIPITOR) 10 mg tablet TAKE 1 TABLET EVERY EVENING TO HELP LOWER CHOLESTEROL    glucose blood VI test strips (TRUETEST TEST STRIPS) strip TEST BLOOD SUGAR AND RECORD RESULTS IN NOTEBOOK FOR DOCTOR     No current facility-administered medications for this visit.       Allergies   Allergen Reactions   • Codeine Other (comments)     Sweating     Review of Systems:  - Eyes: no blurry vision or double vision  - Cardiovascular: no chest pain  - Respiratory: no shortness of breath  - Musculoskeletal: no myalgias  - Neurological: no numbness/tingling in extremities    Physical Examination:  There were no vitals taken for this visit.  - GENERAL: NCAT, Appears well nourished   - EYES: EOMI, non-icteric, no proptosis   - Ear/Nose/Throat: NCAT, no visible inflammation or masses   - CARDIOVASCULAR: no cyanosis, no visible JVD   - RESPIRATORY: respiratory effort normal without any distress or labored breathing   - MUSCULOSKELETAL: Normal ROM of neck and upper extremities observed   - SKIN: No rash on face   - NEUROLOGIC:  No facial asymmetry (Cranial nerve 7 motor function), No gaze palsy   - PSYCHIATRIC: Normal affect, Normal insight and judgement     Data Reviewed:   None    Assessment/Plan:   1) DM > Pt reports his BGs are running in a good range on the Januvia 100 mg daily and Metformin XR 500mg with breakfast, 500mg with lunch and 1000mg with dinner.  Pt encouraged to keep up the good work. Will order an A1C  His BP is at goal on no medications.     2) HLD > Pt is tolerating his atorvastatin 10mg well. His Lipid panel was at goal in March 2019. Will order a lipid panel and CMP.      Pt voices understanding and agreement with the plan.    RTC based on the above results.          Copy sent to:  Vero Vasquez

## 2021-02-10 LAB
ALBUMIN SERPL-MCNC: 4.3 G/DL (ref 3.8–4.8)
ALBUMIN/CREAT UR: 4 MG/G CREAT (ref 0–29)
ALBUMIN/GLOB SERPL: 2 {RATIO} (ref 1.2–2.2)
ALP SERPL-CCNC: 66 IU/L (ref 39–117)
ALT SERPL-CCNC: 16 IU/L (ref 0–44)
AST SERPL-CCNC: 16 IU/L (ref 0–40)
BILIRUB SERPL-MCNC: 0.5 MG/DL (ref 0–1.2)
BUN SERPL-MCNC: 16 MG/DL (ref 8–27)
BUN/CREAT SERPL: 15 (ref 10–24)
CALCIUM SERPL-MCNC: 9.6 MG/DL (ref 8.6–10.2)
CHLORIDE SERPL-SCNC: 104 MMOL/L (ref 96–106)
CHOLEST SERPL-MCNC: 129 MG/DL (ref 100–199)
CO2 SERPL-SCNC: 30 MMOL/L (ref 20–29)
CREAT SERPL-MCNC: 1.05 MG/DL (ref 0.76–1.27)
CREAT UR-MCNC: 98 MG/DL
EST. AVERAGE GLUCOSE BLD GHB EST-MCNC: 160 MG/DL
GLOBULIN SER CALC-MCNC: 2.1 G/DL (ref 1.5–4.5)
GLUCOSE SERPL-MCNC: 106 MG/DL (ref 65–99)
HBA1C MFR BLD: 7.2 % (ref 4.8–5.6)
HDLC SERPL-MCNC: 43 MG/DL
INTERPRETATION, 910389: NORMAL
LDLC SERPL CALC-MCNC: 68 MG/DL (ref 0–99)
Lab: NORMAL
MICROALBUMIN UR-MCNC: 3.5 UG/ML
POTASSIUM SERPL-SCNC: 4.9 MMOL/L (ref 3.5–5.2)
PROT SERPL-MCNC: 6.4 G/DL (ref 6–8.5)
SODIUM SERPL-SCNC: 142 MMOL/L (ref 134–144)
TRIGL SERPL-MCNC: 92 MG/DL (ref 0–149)
VLDLC SERPL CALC-MCNC: 18 MG/DL (ref 5–40)

## 2021-02-10 NOTE — PROGRESS NOTES
Spoke with pt regarding his labs. His A1C was up some but will not make any changes at this time. Pt voices understanding and agreement with the plan.

## 2021-08-11 ENCOUNTER — OFFICE VISIT (OUTPATIENT)
Dept: ENDOCRINOLOGY | Age: 66
End: 2021-08-11
Payer: COMMERCIAL

## 2021-08-11 VITALS
SYSTOLIC BLOOD PRESSURE: 127 MMHG | HEIGHT: 70 IN | WEIGHT: 167.6 LBS | BODY MASS INDEX: 23.99 KG/M2 | DIASTOLIC BLOOD PRESSURE: 72 MMHG | HEART RATE: 62 BPM

## 2021-08-11 DIAGNOSIS — E11.9 TYPE 2 DIABETES MELLITUS WITHOUT COMPLICATION, WITHOUT LONG-TERM CURRENT USE OF INSULIN (HCC): Primary | ICD-10-CM

## 2021-08-11 DIAGNOSIS — E78.2 MIXED HYPERLIPIDEMIA: ICD-10-CM

## 2021-08-11 LAB — HBA1C MFR BLD HPLC: 7.2 %

## 2021-08-11 PROCEDURE — 83036 HEMOGLOBIN GLYCOSYLATED A1C: CPT | Performed by: INTERNAL MEDICINE

## 2021-08-11 PROCEDURE — G8427 DOCREV CUR MEDS BY ELIG CLIN: HCPCS | Performed by: INTERNAL MEDICINE

## 2021-08-11 PROCEDURE — 99214 OFFICE O/P EST MOD 30 MIN: CPT | Performed by: INTERNAL MEDICINE

## 2021-08-11 PROCEDURE — G8536 NO DOC ELDER MAL SCRN: HCPCS | Performed by: INTERNAL MEDICINE

## 2021-08-11 PROCEDURE — 3051F HG A1C>EQUAL 7.0%<8.0%: CPT | Performed by: INTERNAL MEDICINE

## 2021-08-11 PROCEDURE — 1101F PT FALLS ASSESS-DOCD LE1/YR: CPT | Performed by: INTERNAL MEDICINE

## 2021-08-11 PROCEDURE — G8432 DEP SCR NOT DOC, RNG: HCPCS | Performed by: INTERNAL MEDICINE

## 2021-08-11 PROCEDURE — G8420 CALC BMI NORM PARAMETERS: HCPCS | Performed by: INTERNAL MEDICINE

## 2021-08-11 PROCEDURE — 2022F DILAT RTA XM EVC RTNOPTHY: CPT | Performed by: INTERNAL MEDICINE

## 2021-08-11 PROCEDURE — 3017F COLORECTAL CA SCREEN DOC REV: CPT | Performed by: INTERNAL MEDICINE

## 2021-08-11 RX ORDER — AMOXICILLIN 500 MG/1
CAPSULE ORAL
COMMUNITY
Start: 2021-07-29 | End: 2022-01-05 | Stop reason: ALTCHOICE

## 2021-08-11 NOTE — LETTER
8/11/2021    Patient: Edgar Lal   YOB: 1955   Date of Visit: 8/11/2021     [address]  [address] [facility] [address]  Via Fax: [phone]    Dear Carina Jenkins NP,      Thank you for referring Mr. Nuria Fernandez to [address] [place] [address] for evaluation. My notes for this consultation are attached. If you have questions, please do not hesitate to call me. I look forward to following your patient along with you.       Sincerely,    Mary Ellen Bagley MD
no

## 2021-08-11 NOTE — PROGRESS NOTES
Chief Complaint   Patient presents with    Diabetes     pcp and pharmacy verified     History of Present Illness: Trini Mercedes is a 78 y.o. male here for follow up of diabetes. He was diagnosed with diabetes around 2009. His A1C today was 7.2%. Pt denies any recent illnesses, injuries or hospitalizations. Pt has received both COVID vaccinations (Kettyreed Lee). Pt is still taking Januvia 100 mg daily and Metformin XR 500mg with breakfast, 500mg with lunch and 1000mg with dinner. He notes he has been taking his Lipitor 10mg in the evening. He checks his BGs 3-4 times per week, when he wakes up or when he gets home from work. His BGs have been running the 120-135 fasting and 90s in the evening. He notes he has been eating so late at night his BGs in the next day have been higher. He denies issues of hypoglycemia. Pt is eating 2-3 meals daily. Pt has breakfast on the weekend only typically a breakfast sandwich. He has lunch around [place], today he had a bag of chips. Pt has dinner around 730PM, last night he had a bowl of cereal.           He denies snacking between meals or evening snacking. His last eye exam was in January 2021, no retinopathy. (report reviewed). No history of vascular disease. No history of neuropathy or nephropathy. Pt is tolerating the Atorvastatin 10mg daily, with no issues. \"I am going to try and make it 3 more years and them I am going to retire. \"      Current Outpatient Medications   Medication Sig    amoxicillin (AMOXIL) 500 mg capsule TAKE 2 CAPSULES ON THE FIRST DOSE THEN TAKE 1 CAPSULE EVERY 8 HOURS THREE TIMES DAILY FOR INFECTION    atorvastatin (LIPITOR) 10 mg tablet TAKE 1 TABLET BY MOUTH EVERY EVENING TO HELP LOWER CHOLESTEROL    metFORMIN ER (GLUCOPHAGE XR) 500 mg tablet TAKE 1 TABLET WITH BREAKFAST AND LUNCH AND TAKE 2 TABLETS WITH DINNER    SITagliptin (Januvia) 100 mg tablet TAKE 1 TABLET BY MOUTH DAILY.  TO HELP LOWER BLOOD SUGAR    cyanocobalamin 1,000 mcg tablet DISSOLVE 1 TABLET IN MOUTH DAILY.  glucose blood VI test strips (TRUETEST TEST STRIPS) strip TEST BLOOD SUGAR AND RECORD RESULTS IN NOTEBOOK FOR DOCTOR     No current facility-administered medications for this visit. Allergies   Allergen Reactions    Codeine Other (comments)     Sweating     Review of Systems:  - Eyes: no blurry vision or double vision  - Cardiovascular: no chest pain  - Respiratory: no shortness of breath  - Musculoskeletal: no myalgias  - Neurological: no numbness/tingling in extremities    Physical Examination:  Blood pressure 127/72, pulse 62, height 5' 10\" (1.778 m), weight 167 lb 9.6 oz (76 kg). - General: pleasant, no distress, good eye contact   - Neck: no carotid bruits  - Cardiovascular: regular, normal rate, nl s1 and s2, no m/r/g, 2+ DP pulses   - Respiratory: clear bilaterally  - Integumentary: no edema, no foot ulcers  - Psychiatric: normal mood and affect    Diabetic foot exam:     Left Foot:   Visual Exam: normal    Pulse DP: 2+ (normal)   Filament test: normal sensation    Vibratory sensation: normal      Right Foot:   Visual Exam: normal    Pulse DP: 2+ (normal)   Filament test: normal sensation    Vibratory sensation: normal        Data Reviewed:   His A1C today was 7.2%. Assessment/Plan:   1) DM > His A1C today was 7.2%. It was 7.2% in February 2021 as well. Pt notes he has been drinking regular soda and sweet tea in the evening. Pt instrcted to stop the regular sodas and sugar sweetened tea, but continue the Januvia 100 mg daily and Metformin XR 500mg with breakfast, 500mg with lunch and 1000mg with dinner. His BP is at goal on no medications. 2) HLD > Pt is tolerating his atorvastatin 10mg well. His Lipid panel was at goal in February 2021. Pt voices understanding and agreement with the plan. RTC 5 months  Follow-up and Dispositions    · Return in about 5 months (around 1/11/2022).          Copy sent to:  [facility] Sydnie Gutierrez

## 2021-10-09 RX ORDER — LANOLIN ALCOHOL/MO/W.PET/CERES
CREAM (GRAM) TOPICAL
Qty: 100 TABLET | Refills: 0 | Status: SHIPPED | OUTPATIENT
Start: 2021-10-09 | End: 2022-03-10

## 2021-11-08 RX ORDER — METFORMIN HYDROCHLORIDE 500 MG/1
TABLET, EXTENDED RELEASE ORAL
Qty: 120 TABLET | Refills: 9 | Status: SHIPPED | OUTPATIENT
Start: 2021-11-08 | End: 2022-01-05 | Stop reason: SDUPTHER

## 2022-01-01 DIAGNOSIS — E78.2 MIXED HYPERLIPIDEMIA: ICD-10-CM

## 2022-01-01 DIAGNOSIS — E11.9 TYPE 2 DIABETES MELLITUS WITHOUT COMPLICATION, WITHOUT LONG-TERM CURRENT USE OF INSULIN (HCC): ICD-10-CM

## 2022-01-05 ENCOUNTER — OFFICE VISIT (OUTPATIENT)
Dept: ENDOCRINOLOGY | Age: 67
End: 2022-01-05
Payer: COMMERCIAL

## 2022-01-05 VITALS
DIASTOLIC BLOOD PRESSURE: 65 MMHG | WEIGHT: 169.4 LBS | SYSTOLIC BLOOD PRESSURE: 121 MMHG | HEART RATE: 62 BPM | BODY MASS INDEX: 24.25 KG/M2 | HEIGHT: 70 IN

## 2022-01-05 DIAGNOSIS — E78.2 MIXED HYPERLIPIDEMIA: ICD-10-CM

## 2022-01-05 DIAGNOSIS — E11.9 TYPE 2 DIABETES MELLITUS WITHOUT COMPLICATION, WITHOUT LONG-TERM CURRENT USE OF INSULIN (HCC): Primary | ICD-10-CM

## 2022-01-05 LAB — HBA1C MFR BLD HPLC: 7.6 %

## 2022-01-05 PROCEDURE — G8427 DOCREV CUR MEDS BY ELIG CLIN: HCPCS | Performed by: INTERNAL MEDICINE

## 2022-01-05 PROCEDURE — 3051F HG A1C>EQUAL 7.0%<8.0%: CPT | Performed by: INTERNAL MEDICINE

## 2022-01-05 PROCEDURE — 2022F DILAT RTA XM EVC RTNOPTHY: CPT | Performed by: INTERNAL MEDICINE

## 2022-01-05 PROCEDURE — G8432 DEP SCR NOT DOC, RNG: HCPCS | Performed by: INTERNAL MEDICINE

## 2022-01-05 PROCEDURE — 83036 HEMOGLOBIN GLYCOSYLATED A1C: CPT | Performed by: INTERNAL MEDICINE

## 2022-01-05 PROCEDURE — 3017F COLORECTAL CA SCREEN DOC REV: CPT | Performed by: INTERNAL MEDICINE

## 2022-01-05 PROCEDURE — G8536 NO DOC ELDER MAL SCRN: HCPCS | Performed by: INTERNAL MEDICINE

## 2022-01-05 PROCEDURE — G8420 CALC BMI NORM PARAMETERS: HCPCS | Performed by: INTERNAL MEDICINE

## 2022-01-05 PROCEDURE — 1101F PT FALLS ASSESS-DOCD LE1/YR: CPT | Performed by: INTERNAL MEDICINE

## 2022-01-05 PROCEDURE — 99214 OFFICE O/P EST MOD 30 MIN: CPT | Performed by: INTERNAL MEDICINE

## 2022-01-05 RX ORDER — METFORMIN HYDROCHLORIDE 500 MG/1
TABLET, EXTENDED RELEASE ORAL
Qty: 360 TABLET | Refills: 9 | Status: SHIPPED | OUTPATIENT
Start: 2022-01-05 | End: 2022-09-30 | Stop reason: SDUPTHER

## 2022-01-05 NOTE — PROGRESS NOTES
Chief Complaint   Patient presents with    Diabetes     pcp and pharmacy verified     History of Present Illness: Dusty Jenkins is a 78 y.o. male here for follow up of diabetes. He was diagnosed with diabetes around 2009. At our last visit in August 2021 his A1C was 7.2%, pt was instructed to stop the sweet tea and regular sodas, but continue the Januvia 100 mg daily and Metformin XR 500mg with breakfast, 500mg with lunch and 1000mg with dinner. His A1C today was 7.6%. Pt denies any recent illnesses, injuries or hospitalizations. Pt has received both COVID vaccinations (Westly Poisson). Pt is still taking Januvia 100 mg daily and Metformin XR 500mg with breakfast, 500mg with lunch and 1000mg with dinner. He notes he has been taking his Lipitor 10mg in the evening. He checks his BGs 3-4 times per week, when he wakes up or when he gets home from work. His BGs have been running the 120-135 fasting and 90-110s in the evening. He denies issues of hypoglycemia. Pt is eating 2-3 meals daily. Pt has breakfast on the weekend only typically a breakfast sandwich. He has lunch around [place], today he had celery and ranch dressing and coffee (black). Pt has dinner around 630PM, last night he had fried chicken, fries and regular soda. He denies snacking between meals or evening snacking. \"I am trying not to eat after 7PM    His last eye exam was in January 2021, no retinopathy. (report reviewed). No history of vascular disease. No history of neuropathy or nephropathy. Pt is tolerating the Atorvastatin 10mg daily, with no issues. \"I am going to try and make it 2 more years and them I am going to retire. \"      Current Outpatient Medications   Medication Sig    SITagliptin (Januvia) 100 mg tablet TAKE 1 TABLET BY MOUTH DAILY TO HELP LOWER BLOOD SUGAR    metFORMIN ER (GLUCOPHAGE XR) 500 mg tablet One with breakfast, one with lunch and two with dinner    empagliflozin (JARDIANCE) 25 mg tablet ad    cyanocobalamin 1,000 mcg tablet DISSOLVE 1 TABLET IN MOUTH DAILY.  atorvastatin (LIPITOR) 10 mg tablet TAKE 1 TABLET BY MOUTH EVERY EVENING TO HELP LOWER CHOLESTEROL    glucose blood VI test strips (TRUETEST TEST STRIPS) strip TEST BLOOD SUGAR AND RECORD RESULTS IN NOTEBOOK FOR DOCTOR     No current facility-administered medications for this visit. Allergies   Allergen Reactions    Codeine Other (comments)     Sweating     Review of Systems:  - Eyes: no blurry vision or double vision  - Cardiovascular: no chest pain  - Respiratory: no shortness of breath  - Musculoskeletal: no myalgias  - Neurological: no numbness/tingling in extremities    Physical Examination:  Blood pressure 121/65, pulse 62, height 5' 10\" (1.778 m), weight 169 lb 6.4 oz (76.8 kg). - General: pleasant, no distress, good eye contact   - Neck: no carotid bruits  - Cardiovascular: regular, normal rate, nl s1 and s2, no m/r/g, 2+ DP pulses   - Respiratory: clear bilaterally  - Integumentary: no edema, no foot ulcers  - Psychiatric: normal mood and affect    Diabetic foot exam:     Left Foot:   Visual Exam: normal    Pulse DP: 2+ (normal)   Filament test: normal sensation    Vibratory sensation: normal      Right Foot:   Visual Exam: normal    Pulse DP: 2+ (normal)   Filament test: normal sensation    Vibratory sensation: normal        Data Reviewed:   His A1C today was 7.6%. Assessment/Plan:   1) DM > His A1C today was 7.6%. Will start pt on Jardiance 10mg daily in addition to the Januvia 100 mg daily and Metformin XR 500mg with breakfast, 500mg with lunch and 1000mg with dinner. His BP is at goal on no HTN medications. 2) HLD > Pt is tolerating his atorvastatin 10mg well. His Lipid panel was at goal in February 2021. Will order a lipid panel and CMP. Pt voices understanding and agreement with the plan. RTC 4 months  Follow-up and Dispositions    · Return in about 4 months (around 5/5/2022).          Copy sent to:  Hawa Lombardi

## 2022-01-05 NOTE — LETTER
1/5/2022    Patient: Sharath Smyth   YOB: 1955   Date of Visit: 1/5/2022     Ernesto Hsu NP  [address] [address]  Via Fax: [phone]    Dear Ernesto Hsu NP,      Thank you for referring Mr. Sreedhar Ambrocio to [address] [place] [address] for evaluation. My notes for this consultation are attached. If you have questions, please do not hesitate to call me. I look forward to following your patient along with you.       Sincerely,    Veronique Story MD

## 2022-02-15 RX ORDER — ATORVASTATIN CALCIUM 10 MG/1
TABLET, FILM COATED ORAL
Qty: 90 TABLET | Refills: 2 | Status: SHIPPED | OUTPATIENT
Start: 2022-02-15 | End: 2022-09-30 | Stop reason: SDUPTHER

## 2022-03-10 RX ORDER — LANOLIN ALCOHOL/MO/W.PET/CERES
CREAM (GRAM) TOPICAL
Qty: 100 TABLET | Refills: 0 | Status: SHIPPED | OUTPATIENT
Start: 2022-03-10 | End: 2022-06-22

## 2022-03-18 PROBLEM — E11.9 TYPE 2 DIABETES MELLITUS WITHOUT COMPLICATION, WITHOUT LONG-TERM CURRENT USE OF INSULIN (HCC): Status: ACTIVE | Noted: 2017-03-06

## 2022-04-05 ENCOUNTER — TELEPHONE (OUTPATIENT)
Dept: ENDOCRINOLOGY | Age: 67
End: 2022-04-05

## 2022-04-05 NOTE — TELEPHONE ENCOUNTER
YVROSE: Spoke with pharmacist. He said that in January patient paid $5 for 90 days. Now insurance is charging him $331. Patient does not know this yet. Advised to have patient call his insurance company to find out which medication he can get that is similar to [address] [facility] for the lowest copay amount. Pharmacist said he will call patient's wife. Advised to have patient call back with the information. He said he will tell the patient.

## 2022-04-05 NOTE — TELEPHONE ENCOUNTER
Pharmacy faxed a notice that even with insurance and copay card Orlin Ang is still too expensive. Wonders if something less expensive can be prescribed.

## 2022-04-05 NOTE — TELEPHONE ENCOUNTER
I called pt and he said that the cost of the Yeni Eloisa is not too much and does not need to change at this time.

## 2022-04-06 ENCOUNTER — TELEPHONE (OUTPATIENT)
Dept: ENDOCRINOLOGY | Age: 67
End: 2022-04-06

## 2022-04-06 NOTE — TELEPHONE ENCOUNTER
I had ordered the Jardinance 10mg at our last visit in addition to the [address] [facility] [address]. We can send both prescriptions to a different pharmacy.

## 2022-04-06 NOTE — TELEPHONE ENCOUNTER
Spoke with patient. He said that Januvia and Vic Doherty can be sent to [facility].

## 2022-04-06 NOTE — TELEPHONE ENCOUNTER
4/6/2022   11:06 AM        Pt was returning Jacqueline Santos call from yesterday. Pt stated Jacqueline Santos had called him and his wife on yesterday. [place]#[phone]      Thanks,  Hernando Dunaway

## 2022-04-11 RX ORDER — GLIPIZIDE 5 MG/1
5 TABLET ORAL 2 TIMES DAILY
Qty: 60 TABLET | Refills: 5 | Status: SHIPPED | OUTPATIENT
Start: 2022-04-11 | End: 2022-09-30 | Stop reason: SDUPTHER

## 2022-04-11 NOTE — TELEPHONE ENCOUNTER
Per formulary for [employer] P[employer]:  Tier 1:   Glipizide, glimepiride, glip-metformin,pioglitazone  Tier 2:  Nateglinide,acarbose

## 2022-04-11 NOTE — TELEPHONE ENCOUNTER
1) Have him cut the Jardiance in 1/2 and take 1/2 a pill every day. 2) When he runs out of the Januvia I will order glipizide. It is a 5mg pill and he will take one pill in the morning and one pill with dinner. I will send the Glipizide to the [facility].

## 2022-04-11 NOTE — TELEPHONE ENCOUNTER
4/11/2022    Pt called and left a vm at 10:05 am stating he has concerns on prescriptions he purchased on  4/10. There is an increase in the cost of the prescription. He would like to speak with someone to possibly being put on something else after finishing the medications he purchased.      Januvia 100mg  Jardiance 25mg- pt stated he think the jaudice supposed to be 10mg     He can be reached at [phone]    Thanks,   Stepan Jimenez

## 2022-04-11 NOTE — TELEPHONE ENCOUNTER
When he runs out of Jardiance (when cutting in half), is he to stop this also? (Echo Roger is Tier 3)

## 2022-04-12 ENCOUNTER — TELEPHONE (OUTPATIENT)
Dept: ENDOCRINOLOGY | Age: 67
End: 2022-04-12

## 2022-04-12 NOTE — TELEPHONE ENCOUNTER
Per Toña Ware in Rx request:   1) Have him cut the Jardiance in 1/2 and take 1/2 a pill every day.    2) When he runs out of the Januvia I will order glipizide. It is a 5mg pill and he will take one pill in the morning and one pill with dinner.     I will send the Glipizide to the [address] [place] [address] [place] drug store. And:    When he runs out of Jardiance (when cutting in half), is he to stop this also? ([place] is Tier 3)    Since the cost is too high, then yes, lets just go with the Glipizide 5mg twice per day for now.          Patient has been informed and expressed understanding.     )

## 2022-05-01 DIAGNOSIS — E11.9 TYPE 2 DIABETES MELLITUS WITHOUT COMPLICATION, WITHOUT LONG-TERM CURRENT USE OF INSULIN (HCC): ICD-10-CM

## 2022-05-01 DIAGNOSIS — E78.2 MIXED HYPERLIPIDEMIA: ICD-10-CM

## 2022-05-11 ENCOUNTER — OFFICE VISIT (OUTPATIENT)
Dept: ENDOCRINOLOGY | Age: 67
End: 2022-05-11
Payer: COMMERCIAL

## 2022-05-11 VITALS
DIASTOLIC BLOOD PRESSURE: 59 MMHG | WEIGHT: 169.2 LBS | BODY MASS INDEX: 24.22 KG/M2 | HEART RATE: 60 BPM | HEIGHT: 70 IN | SYSTOLIC BLOOD PRESSURE: 117 MMHG

## 2022-05-11 DIAGNOSIS — E11.9 TYPE 2 DIABETES MELLITUS WITHOUT COMPLICATION, WITHOUT LONG-TERM CURRENT USE OF INSULIN (HCC): Primary | ICD-10-CM

## 2022-05-11 DIAGNOSIS — E78.2 MIXED HYPERLIPIDEMIA: ICD-10-CM

## 2022-05-11 LAB
ALBUMIN SERPL-MCNC: 4.4 G/DL (ref 3.8–4.8)
ALBUMIN/CREAT UR: 3 MG/G CREAT (ref 0–29)
ALBUMIN/GLOB SERPL: 2.1 {RATIO} (ref 1.2–2.2)
ALP SERPL-CCNC: 75 IU/L (ref 44–121)
ALT SERPL-CCNC: 21 IU/L (ref 0–44)
AST SERPL-CCNC: 14 IU/L (ref 0–40)
BILIRUB SERPL-MCNC: 0.4 MG/DL (ref 0–1.2)
BUN SERPL-MCNC: 19 MG/DL (ref 8–27)
BUN/CREAT SERPL: 17 (ref 10–24)
CALCIUM SERPL-MCNC: 9.6 MG/DL (ref 8.6–10.2)
CHLORIDE SERPL-SCNC: 102 MMOL/L (ref 96–106)
CHOLEST SERPL-MCNC: 154 MG/DL (ref 100–199)
CO2 SERPL-SCNC: 25 MMOL/L (ref 20–29)
CREAT SERPL-MCNC: 1.15 MG/DL (ref 0.76–1.27)
CREAT UR-MCNC: 120.1 MG/DL
EGFR: 70 ML/MIN/1.73
EST. AVERAGE GLUCOSE BLD GHB EST-MCNC: 157 MG/DL
GLOBULIN SER CALC-MCNC: 2.1 G/DL (ref 1.5–4.5)
GLUCOSE SERPL-MCNC: 100 MG/DL (ref 65–99)
HBA1C MFR BLD: 7.1 % (ref 4.8–5.6)
HDLC SERPL-MCNC: 37 MG/DL
IMP & REVIEW OF LAB RESULTS: NORMAL
LDLC SERPL CALC-MCNC: 79 MG/DL (ref 0–99)
MICROALBUMIN UR-MCNC: 3.4 UG/ML
POTASSIUM SERPL-SCNC: 4.5 MMOL/L (ref 3.5–5.2)
PROT SERPL-MCNC: 6.5 G/DL (ref 6–8.5)
SODIUM SERPL-SCNC: 142 MMOL/L (ref 134–144)
TRIGL SERPL-MCNC: 227 MG/DL (ref 0–149)
VLDLC SERPL CALC-MCNC: 38 MG/DL (ref 5–40)

## 2022-05-11 PROCEDURE — 1101F PT FALLS ASSESS-DOCD LE1/YR: CPT | Performed by: INTERNAL MEDICINE

## 2022-05-11 PROCEDURE — 99214 OFFICE O/P EST MOD 30 MIN: CPT | Performed by: INTERNAL MEDICINE

## 2022-05-11 PROCEDURE — 3017F COLORECTAL CA SCREEN DOC REV: CPT | Performed by: INTERNAL MEDICINE

## 2022-05-11 PROCEDURE — 2022F DILAT RTA XM EVC RTNOPTHY: CPT | Performed by: INTERNAL MEDICINE

## 2022-05-11 PROCEDURE — G8420 CALC BMI NORM PARAMETERS: HCPCS | Performed by: INTERNAL MEDICINE

## 2022-05-11 PROCEDURE — G8432 DEP SCR NOT DOC, RNG: HCPCS | Performed by: INTERNAL MEDICINE

## 2022-05-11 PROCEDURE — G8536 NO DOC ELDER MAL SCRN: HCPCS | Performed by: INTERNAL MEDICINE

## 2022-05-11 PROCEDURE — 3051F HG A1C>EQUAL 7.0%<8.0%: CPT | Performed by: INTERNAL MEDICINE

## 2022-05-11 PROCEDURE — G8427 DOCREV CUR MEDS BY ELIG CLIN: HCPCS | Performed by: INTERNAL MEDICINE

## 2022-05-11 NOTE — PROGRESS NOTES
Chief Complaint   Patient presents with    Diabetes     pcp and pharmacy verified     History of Present Illness: Monse Curiel is a 78 y.o. male here for follow up of diabetes. He was diagnosed with diabetes around 2009. At our last visit his A1C was 7.6% on  Januvia 100 mg daily and Metformin XR 500mg with breakfast, 500mg with lunch and 1000mg with dinner. I started him on Jardiance 10mg daily. Since our last visit pt's pharmacist called noting the cost of the Januvia and Erica Fallen were increasing and we discussed adjusting his regimen. For now he is taking the Jardiance 10mg, Januvia 100 mg daily and Metformin XR 500mg with breakfast, 500mg with lunch and 1000mg with dinner. His A1C today was down to 7.1%. Pt has a 90 day prescription for the Jardiance and the Januvia. He received the 25mg pills of the Jardiance and is taking 1/2 a pll every day. He is not currently taking Glipizide. Pt denies any recent illnesses, injuries or hospitalizations. Pt has received both COVID vaccinations (Gian Barrientos). He notes he has been taking his Lipitor 10mg in the evening. He checks his BGs 3-4 times per week, when he wakes up or when he gets home from work. His BGs have been running the 120-135 fasting and 90-110s in the evening. He denies issues of hypoglycemia. Pt is eating 2-3 meals daily. Pt has breakfast on the weekend only typically a breakfast sandwich. He has lunch around [place], today he had a fish sandwich and water. Pt has dinner around 630-7PM, last night he had salad and iced tea. He denies snacking between meals or evening snacking. \"I am trying not to eat after 7PM    His last eye exam was in January 2022, no retinopathy. (report reviewed). No history of vascular disease. No history of neuropathy or nephropathy. Pt is tolerating the Atorvastatin 10mg daily, with no issues.     \"I am going to try and make it 2 more years and them I am going to retire. \"      Current Outpatient Medications   Medication Sig    empagliflozin (JARDIANCE) 25 mg tablet Take 1 tablet daily (DRINK LOTS OF WATER) (Patient taking differently: Take 12.5 mg by mouth daily. (DRINK LOTS OF WATER))    SITagliptin (Januvia) 100 mg tablet TAKE 1 TABLET BY MOUTH DAILY TO HELP LOWER BLOOD SUGAR    cyanocobalamin 1,000 mcg tablet 1 tablet daily    atorvastatin (LIPITOR) 10 mg tablet TAKE 1 TABLET BY MOUTH EVERY EVENING TO HELP LOWER CHOLESTEROL    metFORMIN ER (GLUCOPHAGE XR) 500 mg tablet One with breakfast, one with lunch and two with dinner    glucose blood VI test strips (TRUETEST TEST STRIPS) strip TEST BLOOD SUGAR AND RECORD RESULTS IN NOTEBOOK FOR DOCTOR    glipiZIDE (GLUCOTROL) 5 mg tablet Take 1 Tablet by mouth two (2) times a day. (Patient not taking: Reported on 5/11/2022)     No current facility-administered medications for this visit. Allergies   Allergen Reactions    Codeine Other (comments)     Sweating     Review of Systems:  - Eyes: no blurry vision or double vision  - Cardiovascular: no chest pain  - Respiratory: no shortness of breath  - Musculoskeletal: no myalgias  - Neurological: no numbness/tingling in extremities    Physical Examination:  Blood pressure (!) 117/59, pulse 60, height 5' 10\" (1.778 m), weight 169 lb 3.2 oz (76.7 kg). - General: pleasant, no distress, good eye contact   - Neck: no carotid bruits  - Cardiovascular: regular, normal rate, nl s1 and s2, no m/r/g, 2+ DP pulses   - Respiratory: clear bilaterally  - Integumentary: no edema, no foot ulcers  - Psychiatric: normal mood and affect    Diabetic foot exam:     Left Foot:   Visual Exam: normal    Pulse DP: 2+ (normal)   Filament test: normal sensation    Vibratory sensation: normal      Right Foot:   Visual Exam: normal    Pulse DP: 2+ (normal)   Filament test: normal sensation    Vibratory sensation: normal        Data Reviewed:   His A1C today was 7.1%.   Lipid panel 154/227/37/79    Assessment/Plan:   1) DM > His A1C today was 7.1%. for now pt to continue the Jardiance 12.5mg daily, Januvia 100mg per day and Metformin XR 500mg with breakfast, 500mg with lunch and 1000mg with dinner. When the Januvia runs out he will change over to the Glipizide 5mg BID. His BP is at goal on no HTN medications. 2) HLD > Pt is tolerating his atorvastatin 10mg well. His Lipid panel was at goal in May 2022. Pt voices understanding and agreement with the plan. RTC 4 months  Follow-up and Dispositions    · Return in about 4 months (around 9/11/2022).          Copy sent to:  Peg Chavarria

## 2022-05-11 NOTE — LETTER
5/11/2022    Patient: Emmie Vargas   YOB: 1955   Date of Visit: 5/11/2022     Marquis Hairston MD  [address] [place] [address]  Via Fax: [phone]    Dear Marquis Hairston MD,      Thank you for referring Mr. Alla Gates to [address] [place] [address] for evaluation. My notes for this consultation are attached. If you have questions, please do not hesitate to call me. I look forward to following your patient along with you.       Sincerely,    Rikki Webb MD

## 2022-09-30 ENCOUNTER — OFFICE VISIT (OUTPATIENT)
Dept: ENDOCRINOLOGY | Age: 67
End: 2022-09-30
Payer: COMMERCIAL

## 2022-09-30 VITALS
DIASTOLIC BLOOD PRESSURE: 69 MMHG | SYSTOLIC BLOOD PRESSURE: 110 MMHG | HEART RATE: 69 BPM | HEIGHT: 70 IN | WEIGHT: 169.2 LBS | BODY MASS INDEX: 24.22 KG/M2

## 2022-09-30 DIAGNOSIS — E11.9 TYPE 2 DIABETES MELLITUS WITHOUT COMPLICATION, WITHOUT LONG-TERM CURRENT USE OF INSULIN (HCC): Primary | ICD-10-CM

## 2022-09-30 DIAGNOSIS — E78.2 MIXED HYPERLIPIDEMIA: ICD-10-CM

## 2022-09-30 LAB — HBA1C MFR BLD HPLC: 6.7 %

## 2022-09-30 PROCEDURE — 2022F DILAT RTA XM EVC RTNOPTHY: CPT | Performed by: INTERNAL MEDICINE

## 2022-09-30 PROCEDURE — G8536 NO DOC ELDER MAL SCRN: HCPCS | Performed by: INTERNAL MEDICINE

## 2022-09-30 PROCEDURE — G8420 CALC BMI NORM PARAMETERS: HCPCS | Performed by: INTERNAL MEDICINE

## 2022-09-30 PROCEDURE — 99214 OFFICE O/P EST MOD 30 MIN: CPT | Performed by: INTERNAL MEDICINE

## 2022-09-30 PROCEDURE — 1101F PT FALLS ASSESS-DOCD LE1/YR: CPT | Performed by: INTERNAL MEDICINE

## 2022-09-30 PROCEDURE — 83036 HEMOGLOBIN GLYCOSYLATED A1C: CPT | Performed by: INTERNAL MEDICINE

## 2022-09-30 PROCEDURE — 3017F COLORECTAL CA SCREEN DOC REV: CPT | Performed by: INTERNAL MEDICINE

## 2022-09-30 PROCEDURE — 3051F HG A1C>EQUAL 7.0%<8.0%: CPT | Performed by: INTERNAL MEDICINE

## 2022-09-30 PROCEDURE — 1123F ACP DISCUSS/DSCN MKR DOCD: CPT | Performed by: INTERNAL MEDICINE

## 2022-09-30 PROCEDURE — G8427 DOCREV CUR MEDS BY ELIG CLIN: HCPCS | Performed by: INTERNAL MEDICINE

## 2022-09-30 PROCEDURE — G8432 DEP SCR NOT DOC, RNG: HCPCS | Performed by: INTERNAL MEDICINE

## 2022-09-30 RX ORDER — METFORMIN HYDROCHLORIDE 500 MG/1
TABLET, EXTENDED RELEASE ORAL
Qty: 360 TABLET | Refills: 3 | Status: SHIPPED | OUTPATIENT
Start: 2022-09-30

## 2022-09-30 RX ORDER — ATORVASTATIN CALCIUM 10 MG/1
10 TABLET, FILM COATED ORAL DAILY
Qty: 90 TABLET | Refills: 3 | Status: SHIPPED | OUTPATIENT
Start: 2022-09-30

## 2022-09-30 RX ORDER — GLIPIZIDE 5 MG/1
2.5 TABLET ORAL 2 TIMES DAILY
Qty: 180 TABLET | Refills: 3 | Status: SHIPPED | OUTPATIENT
Start: 2022-09-30

## 2022-09-30 RX ORDER — BIOTIN 1 MG
TABLET ORAL
Qty: 100 STRIP | Refills: 3 | Status: SHIPPED | OUTPATIENT
Start: 2022-09-30

## 2022-09-30 NOTE — LETTER
9/30/2022    Patient: July Deal   YOB: 1955   Date of Visit: 9/30/2022     Medina Garcia MD  [address] [place] [address]  Via Fax: [phone]    Dear Medina Garcia MD,      Thank you for referring Mr. Jose Cruz Hernandez to [address] [place] [address] for evaluation. My notes for this consultation are attached. If you have questions, please do not hesitate to call me. I look forward to following your patient along with you.       Sincerely,    Amanda Rodriges MD

## 2022-09-30 NOTE — PROGRESS NOTES
Chief Complaint   Patient presents with    Diabetes     Pcp and pharmacy verified     History of Present Illness: Rodney White is a 75 y.o. male here for follow up of diabetes. He was diagnosed with diabetes around 2009. At our last visit in May 2022 his A1C was 7.1%, we stopped the Januvia and started Glipizide 5mg BID. For now he is taking the Jardiance 12.5mg, Glipizide 2.5mg BID and Metformin XR 500mg with breakfast, 500mg with lunch and 1000mg with dinner. He notes he was having low BGs when he started the Glipizide 5mg BID    His A1C today was down to 6.7%. He is testing his BGs fasting every day. His FBGs are running in the  range. Pt denies any recent illnesses, injuries or hospitalizations. Pt has received both COVID vaccinations (Nabila American). He notes he has been taking his Lipitor 10mg in the evening. Pt is eating 2-3 meals daily. Pt has breakfast in the morning of cereal, on the weekend only typically a breakfast sandwich. He has lunch around [place], yesterday he had a slice of pizza and water. Pt has dinner around 630-7PM, last night he had a bowl of chili, corn bread and iced tea. He denies snacking between meals or evening snacking. \"I am trying not to eat after 7PM    His last eye exam was in January 2022, no retinopathy. (report reviewed). No history of vascular disease. No history of neuropathy or nephropathy. Pt is tolerating the Atorvastatin 10mg daily, with no issues. \"I am going to try and make it 2 more years and them I am going to retire. \"      Current Outpatient Medications   Medication Sig    cyanocobalamin 1,000 mcg tablet Take 1 Tablet by mouth daily    glipiZIDE (GLUCOTROL) 5 mg tablet Take 1 Tablet by mouth two (2) times a day.  (Patient taking differently: Take 2.5 mg by mouth two (2) times a day.)    empagliflozin (JARDIANCE) 25 mg tablet Take 1 tablet daily (DRINK LOTS OF WATER) (Patient taking differently: Take 12.5 mg by mouth daily. (DRINK LOTS OF WATER))    atorvastatin (LIPITOR) 10 mg tablet TAKE 1 TABLET BY MOUTH EVERY EVENING TO HELP LOWER CHOLESTEROL    metFORMIN ER (GLUCOPHAGE XR) 500 mg tablet One with breakfast, one with lunch and two with dinner    glucose blood VI test strips (TRUETEST TEST STRIPS) strip TEST BLOOD SUGAR AND RECORD RESULTS IN NOTEBOOK FOR DOCTOR     No current facility-administered medications for this visit. Allergies   Allergen Reactions    Codeine Other (comments)     Sweating     Review of Systems:  - Eyes: no blurry vision or double vision  - Cardiovascular: no chest pain  - Respiratory: no shortness of breath  - Musculoskeletal: no myalgias  - Neurological: no numbness/tingling in extremities    Physical Examination:  Blood pressure 110/69, pulse 69, height 5' 10\" (1.778 m), weight 169 lb 3.2 oz (76.7 kg). General: pleasant, no distress, good eye contact   Neck: no carotid bruits  Cardiovascular: regular, normal rate, nl s1 and s2, no m/r/g, 2+ DP pulses   Respiratory: clear bilaterally  Integumentary: no edema, no foot ulcers  Psychiatric: normal mood and affect    Diabetic foot exam:     Left Foot:   Visual Exam: normal    Pulse DP: 2+ (normal)   Filament test: normal sensation    Vibratory sensation: normal      Right Foot:   Visual Exam: normal    Pulse DP: 2+ (normal)   Filament test: normal sensation    Vibratory sensation: normal        Data Reviewed:   His A1C today was 6.7%. Assessment/Plan:   1) DM > His A1C today was down to 6.7%. Pt encouraged to keep up the good work and continue the [employer] 12.5mg daily, Metformin XR 500mg with breakfast, 500mg with lunch and 1000mg with dinner and Glipizide 2.5mg BID. His BP is at goal on no HTN medications. 2) HLD > Pt is tolerating his atorvastatin 10mg well. His Lipid panel was at goal in May 2022. Pt voices understanding and agreement with the plan.     RTC 4 months        Copy sent to:  Dr. Nadine Avelar

## 2023-01-01 DIAGNOSIS — E11.9 TYPE 2 DIABETES MELLITUS WITHOUT COMPLICATION, WITHOUT LONG-TERM CURRENT USE OF INSULIN (HCC): ICD-10-CM

## 2023-01-01 DIAGNOSIS — E78.2 MIXED HYPERLIPIDEMIA: ICD-10-CM

## 2023-01-27 ENCOUNTER — OFFICE VISIT (OUTPATIENT)
Dept: ENDOCRINOLOGY | Age: 68
End: 2023-01-27
Payer: MEDICARE

## 2023-01-27 VITALS
HEART RATE: 55 BPM | WEIGHT: 166.6 LBS | DIASTOLIC BLOOD PRESSURE: 65 MMHG | HEIGHT: 70 IN | BODY MASS INDEX: 23.85 KG/M2 | SYSTOLIC BLOOD PRESSURE: 106 MMHG

## 2023-01-27 DIAGNOSIS — E11.9 TYPE 2 DIABETES MELLITUS WITHOUT COMPLICATION, WITHOUT LONG-TERM CURRENT USE OF INSULIN (HCC): Primary | ICD-10-CM

## 2023-01-27 DIAGNOSIS — E78.2 MIXED HYPERLIPIDEMIA: ICD-10-CM

## 2023-01-27 PROCEDURE — 3017F COLORECTAL CA SCREEN DOC REV: CPT | Performed by: INTERNAL MEDICINE

## 2023-01-27 PROCEDURE — 1101F PT FALLS ASSESS-DOCD LE1/YR: CPT | Performed by: INTERNAL MEDICINE

## 2023-01-27 PROCEDURE — 99214 OFFICE O/P EST MOD 30 MIN: CPT | Performed by: INTERNAL MEDICINE

## 2023-01-27 PROCEDURE — G8536 NO DOC ELDER MAL SCRN: HCPCS | Performed by: INTERNAL MEDICINE

## 2023-01-27 PROCEDURE — G8427 DOCREV CUR MEDS BY ELIG CLIN: HCPCS | Performed by: INTERNAL MEDICINE

## 2023-01-27 PROCEDURE — 3046F HEMOGLOBIN A1C LEVEL >9.0%: CPT | Performed by: INTERNAL MEDICINE

## 2023-01-27 PROCEDURE — G8420 CALC BMI NORM PARAMETERS: HCPCS | Performed by: INTERNAL MEDICINE

## 2023-01-27 PROCEDURE — 2022F DILAT RTA XM EVC RTNOPTHY: CPT | Performed by: INTERNAL MEDICINE

## 2023-01-27 PROCEDURE — 1123F ACP DISCUSS/DSCN MKR DOCD: CPT | Performed by: INTERNAL MEDICINE

## 2023-01-27 PROCEDURE — G8432 DEP SCR NOT DOC, RNG: HCPCS | Performed by: INTERNAL MEDICINE

## 2023-01-27 NOTE — LETTER
1/27/2023    Patient: Jose Anders   YOB: 1955   Date of Visit: 1/27/2023     Juan Diego Michael MD  [address] [place] [id number]  Via Fax: [phone]    Dear Juan Diego Michael MD,      Thank you for referring Mr. Monique Mauro to [address] [place] [address] for evaluation. My notes for this consultation are attached. If you have questions, please do not hesitate to call me. I look forward to following your patient along with you.       Sincerely,    Darion Stafford MD

## 2023-01-27 NOTE — PROGRESS NOTES
Chief Complaint   Patient presents with    Diabetes     Pcp and pharmacy verified     History of Present Illness: Dianelys Giraldo is a 75 y.o. male here for follow up of diabetes. He was diagnosed with diabetes around 2009. At our last visit in September 2022 his A1c was 6.7% on Jardiance 12.5mg, Glipizide 2.5mg BID and Metformin XR 500mg with breakfast, 500mg with lunch and 1000mg with dinner. Pt was encouraged to keep up the good work. \"I had COVID on [place] day. My wife cooked for two days, and decorated the house, then no one could come. It really knocked me for a loop, but I did get over it. \"    Pt is still taking  Jardiance 12.5mg, Glipizide 2.5mg BID and Metformin XR 500mg with breakfast, 500mg with lunch and 1000mg with dinner. \"A lot of times I forget the lunch Metfomrin\". He tests his BGs 2-3 times per week. \"It runs in the 95-110s most of the times. \"  He is not having issues of hypoglycemia. His A1C today was up to 7.3%. He notes he has been taking his Lipitor 10mg in the evening. Pt is eating 2-3 meals daily. Pt has breakfast around 4AM, yesterday he had cereal and milk. He has lunch around [place], yesterday he had vegetable soup and water. Pt has dinner around 6-7PM, last night he had a tuna fish sandwich, chips and iced tea. He denies snacking between meals or evening snacking. \"I am trying not to eat after 7PM    His last eye exam was in January 2022, no retinopathy. (report reviewed). \"I have an appointment today at [address] [place] [address].    No history of vascular disease. No history of neuropathy or nephropathy. Pt is tolerating the Atorvastatin 10mg daily, with no issues. \"I am going to try and make it 2 more years and them I am going to retire. \"      Current Outpatient Medications   Medication Sig    atorvastatin (LIPITOR) 10 mg tablet Take 1 Tablet by mouth daily. empagliflozin (JARDIANCE) 25 mg tablet Take 0.5 Tablets by mouth daily.  (DRINK LOTS OF WATER) glipiZIDE (GLUCOTROL) 5 mg tablet Take 0.5 Tablets by mouth two (2) times a day. metFORMIN ER (GLUCOPHAGE XR) 500 mg tablet One with breakfast, one with lunch and two with dinner    glucose blood VI test strips (Truetest Test Strips) strip TEST BLOOD SUGAR DAILY    cyanocobalamin 1,000 mcg tablet Take 1 Tablet by mouth daily     No current facility-administered medications for this visit. Allergies   Allergen Reactions    Codeine Other (comments)     Sweating     Review of Systems:  - Eyes: no blurry vision or double vision  - Cardiovascular: no chest pain  - Respiratory: no shortness of breath  - Musculoskeletal: no myalgias  - Neurological: no numbness/tingling in extremities    Physical Examination:  Blood pressure 106/65, pulse (!) 55, height 5' 10\" (1.778 m), weight 166 lb 9.6 oz (75.6 kg). General: pleasant, no distress, good eye contact   Neck: no carotid bruits  Cardiovascular: regular, normal rate, nl s1 and s2, no m/r/g, 2+ DP pulses   Respiratory: clear bilaterally  Integumentary: no edema, no foot ulcers  Psychiatric: normal mood and affect    Diabetic foot exam:     Left Foot:   Visual Exam: normal    Pulse DP: 2+ (normal)   Filament test: normal sensation    Vibratory sensation: normal      Right Foot:   Visual Exam: normal    Pulse DP: 2+ (normal)   Filament test: normal sensation    Vibratory sensation: normal        Data Reviewed:   His A1C today was 7.3%. Assessment/Plan:   1) DM > His A1C today was up to 7.3%. I suspect his recent COVID infection and the recent holidays has played a role in his higher BGs. Pt encouraged to continue the Jardiance 12.5mg daily, Metformin XR 1000mg with breakfast, 500mg and 1000mg with dinner and Glipizide 2.5mg BID. His BP is at goal on no HTN medications. 2) HLD > Pt is tolerating his atorvastatin 10mg well. His Lipid panel was at goal in May 2022. His lipid panel and CMP are still pending.     Pt voices understanding and agreement with the plan.    RTC 4 months  Follow-up and Dispositions    Return in about 4 months (around 5/27/2023).            Copy sent to:  Dr. Homa Betancourt

## 2023-05-26 ENCOUNTER — OFFICE VISIT (OUTPATIENT)
Age: 68
End: 2023-05-26
Payer: COMMERCIAL

## 2023-05-26 VITALS
DIASTOLIC BLOOD PRESSURE: 63 MMHG | BODY MASS INDEX: 24.2 KG/M2 | WEIGHT: 169 LBS | HEART RATE: 65 BPM | SYSTOLIC BLOOD PRESSURE: 115 MMHG | HEIGHT: 70 IN

## 2023-05-26 DIAGNOSIS — E78.2 MIXED HYPERLIPIDEMIA: ICD-10-CM

## 2023-05-26 DIAGNOSIS — E11.9 TYPE 2 DIABETES MELLITUS WITHOUT COMPLICATION, WITHOUT LONG-TERM CURRENT USE OF INSULIN (HCC): Primary | ICD-10-CM

## 2023-05-26 LAB — HBA1C MFR BLD: 7.3 %

## 2023-05-26 PROCEDURE — 83036 HEMOGLOBIN GLYCOSYLATED A1C: CPT | Performed by: INTERNAL MEDICINE

## 2023-05-26 PROCEDURE — 3051F HG A1C>EQUAL 7.0%<8.0%: CPT | Performed by: INTERNAL MEDICINE

## 2023-05-26 PROCEDURE — 1123F ACP DISCUSS/DSCN MKR DOCD: CPT | Performed by: INTERNAL MEDICINE

## 2023-05-26 PROCEDURE — 99214 OFFICE O/P EST MOD 30 MIN: CPT | Performed by: INTERNAL MEDICINE

## 2023-05-26 NOTE — PROGRESS NOTES
Codeine Other (See Comments)     Sweating     Review of Systems:  - Eyes: no blurry vision or double vision  - Cardiovascular: no chest pain  - Respiratory: no shortness of breath  - Musculoskeletal: no myalgias  - Neurological: no numbness/tingling in extremities    Physical Examination:  Blood pressure 115/63, pulse 65, height 5' 10\" (1.778 m), weight 169 lb (76.7 kg). General: pleasant, no distress, good eye contact   Neck: no carotid bruits  Cardiovascular: regular, normal rate, nl s1 and s2, no m/r/g, 2+ DP pulses   Respiratory: clear bilaterally  Integumentary: no edema, no foot ulcers,  Psychiatric: normal mood and affect    Diabetic foot exam:   Left Foot:   Visual Exam: normal   Pulse DP: 2+ (normal)   Filament test: normal sensation   Vibratory Sensation: normal  Right Foot:   Visual Exam: normal   Pulse DP: 2+ (normal)   Filament test: normal sensation   Vibratory Sensation: normal      Data Reviewed:   His A1C today was 7.3%    Assessment/Plan:   1) DM > His A1C today was 7.3%. I will have pt increase his Jardiance to 25mg every day and continue the Metformin XR 1000mg with breakfast and 1000mg with dinner and Glipizide 2.5mg BID. His BP is at goal on no HTN medications. 2) HLD > Pt is tolerating his atorvastatin 10mg well. His Lipid panel was at goal in January 2023. Pt voices understanding and agreement with the plan.     RTC 4 months      Copy sent to:  Dr. Nathen Almazan

## 2023-09-11 RX ORDER — METFORMIN HYDROCHLORIDE 500 MG/1
TABLET, EXTENDED RELEASE ORAL
Qty: 360 TABLET | Refills: 2 | Status: SHIPPED | OUTPATIENT
Start: 2023-09-11

## 2023-10-10 RX ORDER — ATORVASTATIN CALCIUM 10 MG/1
TABLET, FILM COATED ORAL
Qty: 90 TABLET | Refills: 2 | Status: SHIPPED | OUTPATIENT
Start: 2023-10-10

## 2023-10-13 ENCOUNTER — OFFICE VISIT (OUTPATIENT)
Age: 68
End: 2023-10-13
Payer: COMMERCIAL

## 2023-10-13 VITALS
SYSTOLIC BLOOD PRESSURE: 100 MMHG | DIASTOLIC BLOOD PRESSURE: 60 MMHG | BODY MASS INDEX: 23.96 KG/M2 | WEIGHT: 167.4 LBS | HEIGHT: 70 IN | HEART RATE: 68 BPM

## 2023-10-13 DIAGNOSIS — E11.9 TYPE 2 DIABETES MELLITUS WITHOUT COMPLICATION, WITHOUT LONG-TERM CURRENT USE OF INSULIN (HCC): Primary | ICD-10-CM

## 2023-10-13 DIAGNOSIS — E78.2 MIXED HYPERLIPIDEMIA: ICD-10-CM

## 2023-10-13 LAB — HBA1C MFR BLD: 6.9 %

## 2023-10-13 PROCEDURE — 99214 OFFICE O/P EST MOD 30 MIN: CPT | Performed by: INTERNAL MEDICINE

## 2023-10-13 PROCEDURE — 83036 HEMOGLOBIN GLYCOSYLATED A1C: CPT | Performed by: INTERNAL MEDICINE

## 2023-10-13 PROCEDURE — 1123F ACP DISCUSS/DSCN MKR DOCD: CPT | Performed by: INTERNAL MEDICINE

## 2023-10-13 PROCEDURE — 3051F HG A1C>EQUAL 7.0%<8.0%: CPT | Performed by: INTERNAL MEDICINE

## 2023-10-13 RX ORDER — METFORMIN HYDROCHLORIDE 500 MG/1
1000 TABLET, EXTENDED RELEASE ORAL 2 TIMES DAILY
Qty: 360 TABLET | Refills: 1
Start: 2023-10-13

## 2023-10-13 NOTE — PROGRESS NOTES
Chief Complaint   Patient presents with    Diabetes     Pcp and pharmacy verified     History of Present Illness: Katerina Davis is a 70 y.o. male here for follow up of diabetes. e was diagnosed with diabetes around 2009. At our last visit in June 2023 his A1C was 7.3%. I instructed him to increase his Jardiance to 25mg every day and continue the Metformin XR 1000mg with breakfast and 1000mg with dinner and Glipizide 2.5mg BID. Pt denies any recent illnesses, injuries or hospitalizations. Pt is taking Jardiance 25mg every day Metformin XR 1000mg with breakfast and 1000mg with dinner and Glipizide 2.5mg BID. He is testing his Bgs 3 times per week. He will test in the morning. His FBGs have been in the 90-120s     His A1C today was 6.9%. Pt is eating 2-3 meals daily.  - Pt is waking around 4AM  - Pt notes he is not eating till 9AM, and he will have a pack of nabs on his break. - He has lunch around Noon-1PM, yesterday he had tossed salad and water.        - Pt has dinner around 7PM, last night he had hamberger helper, cabbage and unsweetened iced tea. He denies snacking between meals or evening snacking. \"I am trying not to eat after 7PM    His last eye exam was in January 2023, no retinopathy. (report reviewed). No history of vascular disease. No history of neuropathy or nephropathy. Pt is tolerating the Atorvastatin 10mg daily, with no issues. \"I am going to try and make it 2 more years and them I am going to retire. \"    Current Outpatient Medications   Medication Sig    blood glucose test strips (ASCENSIA AUTODISC VI;ONE TOUCH ULTRA TEST VI) strip TEST BLOOD SUGAR DAILY    metFORMIN (GLUCOPHAGE-XR) 500 MG extended release tablet Take 2 tablets by mouth 2 times daily    atorvastatin (LIPITOR) 10 MG tablet TAKE 1 TABLET EVERY DAY FOR CHOLESTEROL    empagliflozin (JARDIANCE) 25 MG tablet Take 1 tablet by mouth daily    cyanocobalamin 1000 MCG tablet Take 1 Tablet by mouth

## 2023-12-12 RX ORDER — GLIPIZIDE 5 MG/1
TABLET ORAL
Qty: 180 TABLET | Refills: 2 | Status: SHIPPED | OUTPATIENT
Start: 2023-12-12

## 2024-02-01 DIAGNOSIS — E11.9 TYPE 2 DIABETES MELLITUS WITHOUT COMPLICATION, WITHOUT LONG-TERM CURRENT USE OF INSULIN (HCC): ICD-10-CM

## 2024-02-01 DIAGNOSIS — E78.2 MIXED HYPERLIPIDEMIA: ICD-10-CM

## 2024-02-13 ENCOUNTER — OFFICE VISIT (OUTPATIENT)
Age: 69
End: 2024-02-13
Payer: COMMERCIAL

## 2024-02-13 VITALS
HEIGHT: 70 IN | DIASTOLIC BLOOD PRESSURE: 68 MMHG | BODY MASS INDEX: 24.28 KG/M2 | WEIGHT: 169.6 LBS | SYSTOLIC BLOOD PRESSURE: 115 MMHG | HEART RATE: 63 BPM

## 2024-02-13 DIAGNOSIS — E78.2 MIXED HYPERLIPIDEMIA: ICD-10-CM

## 2024-02-13 DIAGNOSIS — E11.9 TYPE 2 DIABETES MELLITUS WITHOUT COMPLICATION, WITHOUT LONG-TERM CURRENT USE OF INSULIN (HCC): Primary | ICD-10-CM

## 2024-02-13 LAB
ALBUMIN SERPL-MCNC: 4.4 G/DL (ref 3.9–4.9)
ALBUMIN/GLOB SERPL: 2.1 {RATIO} (ref 1.2–2.2)
ALP SERPL-CCNC: 85 IU/L (ref 44–121)
ALT SERPL-CCNC: 15 IU/L (ref 0–44)
AST SERPL-CCNC: 19 IU/L (ref 0–40)
BILIRUB SERPL-MCNC: 0.4 MG/DL (ref 0–1.2)
BUN SERPL-MCNC: 18 MG/DL (ref 8–27)
BUN/CREAT SERPL: 15 (ref 10–24)
CALCIUM SERPL-MCNC: 9.7 MG/DL (ref 8.6–10.2)
CHLORIDE SERPL-SCNC: 103 MMOL/L (ref 96–106)
CHOLEST SERPL-MCNC: 128 MG/DL (ref 100–199)
CO2 SERPL-SCNC: 21 MMOL/L (ref 20–29)
CREAT SERPL-MCNC: 1.19 MG/DL (ref 0.76–1.27)
EGFRCR SERPLBLD CKD-EPI 2021: 66 ML/MIN/1.73
GLOBULIN SER CALC-MCNC: 2.1 G/DL (ref 1.5–4.5)
GLUCOSE SERPL-MCNC: 88 MG/DL (ref 70–99)
HBA1C MFR BLD: 7.4 % (ref 4.8–5.6)
HDLC SERPL-MCNC: 39 MG/DL
LDLC SERPL CALC-MCNC: 69 MG/DL (ref 0–99)
POTASSIUM SERPL-SCNC: 4.7 MMOL/L (ref 3.5–5.2)
PROT SERPL-MCNC: 6.5 G/DL (ref 6–8.5)
SODIUM SERPL-SCNC: 142 MMOL/L (ref 134–144)
TRIGL SERPL-MCNC: 107 MG/DL (ref 0–149)
VLDLC SERPL CALC-MCNC: 20 MG/DL (ref 5–40)

## 2024-02-13 PROCEDURE — 1123F ACP DISCUSS/DSCN MKR DOCD: CPT | Performed by: INTERNAL MEDICINE

## 2024-02-13 PROCEDURE — 3051F HG A1C>EQUAL 7.0%<8.0%: CPT | Performed by: INTERNAL MEDICINE

## 2024-02-13 PROCEDURE — 99214 OFFICE O/P EST MOD 30 MIN: CPT | Performed by: INTERNAL MEDICINE

## 2024-02-13 NOTE — PROGRESS NOTES
Chief Complaint   Patient presents with    Diabetes     Pcp and pharmacy verified     History of Present Illness: Nitin Davila is a 68 y.o. male here for follow up of diabetes.  He was diagnosed with diabetes around 2009.     At our last visit in October 2023 his A1C was 6.9%. Pt was instructed to continue the Jardiance 25mg every day and continue the Metformin XR 1000mg with breakfast and 1000mg with dinner and Glipizide 2.5mg BID.    Pt denies any recent illnesses, injuries or hospitalizations.    Pt is taking Jardiance 25mg every day Metformin XR 1000mg with breakfast and 1000mg with dinner and Glipizide 2.5mg BID.    He is testing his BG 3-4 times per week. He will test in the morning. His FBGs have been in the 80-140s. \"Sometimes I get up in the middle of the night and have milk and a cookie and my BG the next day can run in the 140s.     His A1C last week was up to 7.4%.    - Pt is waking around 4AM  - Pt notes he is not eating till 9AM, and he will have a pack of nabs on his break.     - He has lunch around Noon-1PM, yesterday he had a slider, chips and water.         - Pt has dinner around 6-7PM, last night he had a hamberger, potato salad and unsweetened iced tea.              He denies snacking between meals or evening snacking. \"I am trying not to eat after 7PM    His last eye exam was in January 2024, no retinopathy. (report reviewed).     No history of vascular disease. No history of neuropathy or nephropathy.     Pt is tolerating the Atorvastatin 10mg daily, with no issues.    \"I am going to try and make it 2 more years and them I am going to retire.\"    Current Outpatient Medications   Medication Sig    glipiZIDE (GLUCOTROL) 5 MG tablet TAKE 1/2 TABLET TWICE A DAY FOR BLOOD SUGAR    blood glucose test strips (ASCENSIA AUTODISC VI;ONE TOUCH ULTRA TEST VI) strip TEST BLOOD SUGAR DAILY    metFORMIN (GLUCOPHAGE-XR) 500 MG extended release tablet Take 2 tablets by mouth 2 times daily    atorvastatin

## 2024-02-14 LAB
ALBUMIN/CREAT UR: <3 MG/G CREAT (ref 0–29)
CREAT UR-MCNC: 111.9 MG/DL
IMP & REVIEW OF LAB RESULTS: NORMAL
Lab: NORMAL
MICROALBUMIN UR-MCNC: <3 UG/ML

## 2024-04-09 RX ORDER — METFORMIN HYDROCHLORIDE 500 MG/1
1000 TABLET, EXTENDED RELEASE ORAL 2 TIMES DAILY
Qty: 360 TABLET | Refills: 1 | Status: SHIPPED | OUTPATIENT
Start: 2024-04-09 | End: 2024-04-10 | Stop reason: SDUPTHER

## 2024-04-09 RX ORDER — ATORVASTATIN CALCIUM 10 MG/1
TABLET, FILM COATED ORAL
Qty: 90 TABLET | Refills: 3 | Status: SHIPPED | OUTPATIENT
Start: 2024-04-09 | End: 2024-04-10 | Stop reason: SDUPTHER

## 2024-04-09 RX ORDER — GLIPIZIDE 5 MG/1
TABLET ORAL
Qty: 90 TABLET | Refills: 3 | Status: SHIPPED | OUTPATIENT
Start: 2024-04-09 | End: 2024-04-10 | Stop reason: SDUPTHER

## 2024-04-09 NOTE — TELEPHONE ENCOUNTER
Patient called stating he needs 90 days on his prescriptions. Insurance will only cover 90 days rxs.  Per last OV:  For now pt to continue the Jardiance 25mg every day, Metformin XR 1000mg with breakfast and 1000mg with dinner and Glipizide 2.5mg BID. Pt is tolerating his atorvastatin 10mg well.

## 2024-04-10 ENCOUNTER — TELEPHONE (OUTPATIENT)
Age: 69
End: 2024-04-10

## 2024-04-10 RX ORDER — METFORMIN HYDROCHLORIDE 500 MG/1
1000 TABLET, EXTENDED RELEASE ORAL 2 TIMES DAILY
Qty: 360 TABLET | Refills: 3 | Status: SHIPPED | OUTPATIENT
Start: 2024-04-10

## 2024-04-10 RX ORDER — GLIPIZIDE 5 MG/1
TABLET ORAL
Qty: 90 TABLET | Refills: 3 | Status: SHIPPED | OUTPATIENT
Start: 2024-04-10

## 2024-04-10 RX ORDER — ATORVASTATIN CALCIUM 10 MG/1
TABLET, FILM COATED ORAL
Qty: 90 TABLET | Refills: 3 | Status: SHIPPED | OUTPATIENT
Start: 2024-04-10

## 2024-04-10 NOTE — TELEPHONE ENCOUNTER
4/10/2024  2:14 PM    Patient left message at 11:07.  Wants to transfer his prescriptions to [facility].  Can be reached at [phone].    Thanks,  Lindsey

## 2024-06-01 DIAGNOSIS — E11.9 TYPE 2 DIABETES MELLITUS WITHOUT COMPLICATION, WITHOUT LONG-TERM CURRENT USE OF INSULIN (HCC): ICD-10-CM

## 2024-06-01 DIAGNOSIS — E78.2 MIXED HYPERLIPIDEMIA: ICD-10-CM

## 2024-06-13 ENCOUNTER — OFFICE VISIT (OUTPATIENT)
Age: 69
End: 2024-06-13
Payer: COMMERCIAL

## 2024-06-13 VITALS
HEART RATE: 72 BPM | HEIGHT: 70 IN | DIASTOLIC BLOOD PRESSURE: 61 MMHG | SYSTOLIC BLOOD PRESSURE: 96 MMHG | BODY MASS INDEX: 23.85 KG/M2 | WEIGHT: 166.6 LBS

## 2024-06-13 DIAGNOSIS — E78.2 MIXED HYPERLIPIDEMIA: ICD-10-CM

## 2024-06-13 DIAGNOSIS — E11.9 TYPE 2 DIABETES MELLITUS WITHOUT COMPLICATION, WITHOUT LONG-TERM CURRENT USE OF INSULIN (HCC): Primary | ICD-10-CM

## 2024-06-13 PROCEDURE — 99214 OFFICE O/P EST MOD 30 MIN: CPT | Performed by: INTERNAL MEDICINE

## 2024-06-13 PROCEDURE — 3051F HG A1C>EQUAL 7.0%<8.0%: CPT | Performed by: INTERNAL MEDICINE

## 2024-06-13 PROCEDURE — 1123F ACP DISCUSS/DSCN MKR DOCD: CPT | Performed by: INTERNAL MEDICINE

## 2024-06-13 NOTE — PROGRESS NOTES
Chief Complaint   Patient presents with    Diabetes     Pcp and pharmacy verified     History of Present Illness: Nitin Davila is a 68 y.o. male here for follow up of diabetes.  He was diagnosed with diabetes around 2009.     At our last visit in February 2024 his A1C was up from 6.9% to 7.4%. Pt noted his BG have been running in the 80-140s fasting. We agreed to  continue the Jardiance 25mg every day, Metformin XR 1000mg with breakfast and 1000mg with dinner and Glipizide 2.5mg BID and re-evaluate at our next visit.    Pt denies any recent illnesses, injuries or hospitalizations.    Pt is taking Jardiance 25mg every day Metformin XR 1000mg with breakfast and 1000mg with dinner and Glipizide 2.5mg BID.    He tests his BG 3-4 times per week. His FBG run in the 80-110s.   \"When it gets into the low 80s I can feel it so I drink something with sugar to bring it back up.\"  He notes this is a rare occurrence.\"    His A1C today was 6.8%.    - Pt is waking around 4AM  - Pt notes he is not eating till 9AM, today he had cereal and milk.      - He has lunch around 1130AM- Noon, yesterday he had a club sandwich, chips and water.          - Pt has dinner around 7-8PM, last night he had ham, cabbage, potatoes, corn and sweet tea.              He denies snacking between meals or evening snacking. \"I am trying not to eat after 7PM    His last eye exam was in January 2024, no retinopathy. (report reviewed).     No history of vascular disease. No history of neuropathy or nephropathy.     Pt is tolerating the Atorvastatin 10mg daily, with no issues.    \"I am going to try and make it 2 more years and them I am going to retire.\"    Current Outpatient Medications   Medication Sig    metFORMIN (GLUCOPHAGE-XR) 500 MG extended release tablet Take 2 tablets by mouth 2 times daily    glipiZIDE (GLUCOTROL) 5 MG tablet TAKE 1/2 TABLET TWICE A DAY FOR BLOOD SUGAR    empagliflozin (JARDIANCE) 25 MG tablet Take 1 tablet by mouth daily

## 2024-10-01 DIAGNOSIS — E78.2 MIXED HYPERLIPIDEMIA: ICD-10-CM

## 2024-10-01 DIAGNOSIS — E11.9 TYPE 2 DIABETES MELLITUS WITHOUT COMPLICATION, WITHOUT LONG-TERM CURRENT USE OF INSULIN (HCC): ICD-10-CM

## 2024-10-29 ENCOUNTER — OFFICE VISIT (OUTPATIENT)
Age: 69
End: 2024-10-29
Payer: COMMERCIAL

## 2024-10-29 VITALS
DIASTOLIC BLOOD PRESSURE: 72 MMHG | WEIGHT: 164.2 LBS | HEART RATE: 64 BPM | BODY MASS INDEX: 23.51 KG/M2 | HEIGHT: 70 IN | SYSTOLIC BLOOD PRESSURE: 118 MMHG

## 2024-10-29 DIAGNOSIS — E78.2 MIXED HYPERLIPIDEMIA: ICD-10-CM

## 2024-10-29 DIAGNOSIS — E11.9 TYPE 2 DIABETES MELLITUS WITHOUT COMPLICATION, WITHOUT LONG-TERM CURRENT USE OF INSULIN (HCC): Primary | ICD-10-CM

## 2024-10-29 LAB
ALBUMIN SERPL-MCNC: 4.3 G/DL (ref 3.9–4.9)
ALP SERPL-CCNC: 87 IU/L (ref 44–121)
ALT SERPL-CCNC: 16 IU/L (ref 0–44)
AST SERPL-CCNC: 19 IU/L (ref 0–40)
BILIRUB SERPL-MCNC: 0.2 MG/DL (ref 0–1.2)
BUN SERPL-MCNC: 21 MG/DL (ref 8–27)
BUN/CREAT SERPL: 26 (ref 10–24)
CALCIUM SERPL-MCNC: 9.6 MG/DL (ref 8.6–10.2)
CHLORIDE SERPL-SCNC: 103 MMOL/L (ref 96–106)
CO2 SERPL-SCNC: 22 MMOL/L (ref 20–29)
CREAT SERPL-MCNC: 0.8 MG/DL (ref 0.76–1.27)
EGFRCR SERPLBLD CKD-EPI 2021: 95 ML/MIN/1.73
GLOBULIN SER CALC-MCNC: 2 G/DL (ref 1.5–4.5)
GLUCOSE SERPL-MCNC: 64 MG/DL (ref 70–99)
HBA1C MFR BLD: 7.4 % (ref 4.8–5.6)
POTASSIUM SERPL-SCNC: 4.1 MMOL/L (ref 3.5–5.2)
PROT SERPL-MCNC: 6.3 G/DL (ref 6–8.5)
SODIUM SERPL-SCNC: 140 MMOL/L (ref 134–144)

## 2024-10-29 PROCEDURE — 1123F ACP DISCUSS/DSCN MKR DOCD: CPT | Performed by: INTERNAL MEDICINE

## 2024-10-29 PROCEDURE — 99214 OFFICE O/P EST MOD 30 MIN: CPT | Performed by: INTERNAL MEDICINE

## 2024-10-29 PROCEDURE — 3051F HG A1C>EQUAL 7.0%<8.0%: CPT | Performed by: INTERNAL MEDICINE

## 2024-10-29 RX ORDER — GLIPIZIDE 5 MG/1
TABLET ORAL
Qty: 180 TABLET | Refills: 1 | Status: SHIPPED | OUTPATIENT
Start: 2024-10-29

## 2024-10-29 NOTE — PROGRESS NOTES
Chloride      96 - 106 mmol/L 103    CARBON DIOXIDE      20 - 29 mmol/L 22    Calcium      8.6 - 10.2 mg/dL 9.6    Total Protein      6.0 - 8.5 g/dL 6.3    Albumin      3.9 - 4.9 g/dL 4.3    Globulin, Total      1.5 - 4.5 g/dL 2.0    Total Bilirubin      0.0 - 1.2 mg/dL 0.2    Alkaline Phosphatase      44 - 121 IU/L 87    AST      0 - 40 IU/L 19    ALT      0 - 44 IU/L 16    Hemoglobin A1C      4.8 - 5.6 % 7.4 (H)      Assessment/Plan:   1) DM > His A1C last week was 7.4%. Pt to continue the Jardiance 25mg every day, Metformin XR 1000mg with breakfast and 1000mg with dinner and increase the Glipizide to 5mg BID.   His BP is consistently at goal on no HTN medications.     2) HLD > Pt is tolerating his atorvastatin 10mg well. His Lipid panel was at goal in February 2024.      Pt voices understanding and agreement with the plan.    RTC 4 months      Copy sent to:  Dr. Johann Menchaca

## 2024-11-05 RX ORDER — CALCIUM CITRATE/VITAMIN D3 200MG-6.25
TABLET ORAL
Qty: 100 EACH | Refills: 3 | Status: SHIPPED | OUTPATIENT
Start: 2024-11-05

## 2024-11-25 RX ORDER — BLOOD SUGAR DIAGNOSTIC
STRIP MISCELLANEOUS
Qty: 100 EACH | Refills: 1 | Status: SHIPPED | OUTPATIENT
Start: 2024-11-25

## 2025-02-01 DIAGNOSIS — E78.2 MIXED HYPERLIPIDEMIA: ICD-10-CM

## 2025-02-01 DIAGNOSIS — E11.9 TYPE 2 DIABETES MELLITUS WITHOUT COMPLICATION, WITHOUT LONG-TERM CURRENT USE OF INSULIN (HCC): ICD-10-CM

## 2025-02-13 RX ORDER — ATORVASTATIN CALCIUM 10 MG/1
TABLET, FILM COATED ORAL
Qty: 90 TABLET | Refills: 1 | Status: SHIPPED | OUTPATIENT
Start: 2025-02-13

## 2025-02-13 RX ORDER — METFORMIN HYDROCHLORIDE 500 MG/1
1000 TABLET, EXTENDED RELEASE ORAL 2 TIMES DAILY
Qty: 360 TABLET | Refills: 1 | Status: SHIPPED | OUTPATIENT
Start: 2025-02-13

## 2025-02-25 LAB
ALBUMIN SERPL-MCNC: 4.4 G/DL (ref 3.9–4.9)
ALP SERPL-CCNC: 84 IU/L (ref 44–121)
ALT SERPL-CCNC: 14 IU/L (ref 0–44)
AST SERPL-CCNC: 16 IU/L (ref 0–40)
BILIRUB SERPL-MCNC: 0.5 MG/DL (ref 0–1.2)
BUN SERPL-MCNC: 19 MG/DL (ref 8–27)
BUN/CREAT SERPL: 20 (ref 10–24)
CALCIUM SERPL-MCNC: 9.8 MG/DL (ref 8.6–10.2)
CHLORIDE SERPL-SCNC: 107 MMOL/L (ref 96–106)
CHOLEST SERPL-MCNC: 133 MG/DL (ref 100–199)
CO2 SERPL-SCNC: 23 MMOL/L (ref 20–29)
CREAT SERPL-MCNC: 0.96 MG/DL (ref 0.76–1.27)
EGFRCR SERPLBLD CKD-EPI 2021: 85 ML/MIN/1.73
GLOBULIN SER CALC-MCNC: 2.1 G/DL (ref 1.5–4.5)
GLUCOSE SERPL-MCNC: 123 MG/DL (ref 70–99)
HBA1C MFR BLD: 7 % (ref 4.8–5.6)
HDLC SERPL-MCNC: 40 MG/DL
LDLC SERPL CALC-MCNC: 72 MG/DL (ref 0–99)
POTASSIUM SERPL-SCNC: 4.5 MMOL/L (ref 3.5–5.2)
PROT SERPL-MCNC: 6.5 G/DL (ref 6–8.5)
SODIUM SERPL-SCNC: 144 MMOL/L (ref 134–144)
TRIGL SERPL-MCNC: 113 MG/DL (ref 0–149)
VLDLC SERPL CALC-MCNC: 21 MG/DL (ref 5–40)

## 2025-02-26 LAB
ALBUMIN/CREAT UR: 5 MG/G CREAT (ref 0–29)
CREAT UR-MCNC: 114.2 MG/DL
IMP & REVIEW OF LAB RESULTS: NORMAL
Lab: NORMAL
MICROALBUMIN UR-MCNC: 5.6 UG/ML

## 2025-04-08 RX ORDER — EMPAGLIFLOZIN 25 MG/1
25 TABLET, FILM COATED ORAL DAILY
Qty: 90 TABLET | Refills: 1 | Status: SHIPPED | OUTPATIENT
Start: 2025-04-08

## 2025-05-13 RX ORDER — METFORMIN HYDROCHLORIDE 500 MG/1
1000 TABLET, EXTENDED RELEASE ORAL 2 TIMES DAILY
Qty: 360 TABLET | Refills: 1 | Status: SHIPPED | OUTPATIENT
Start: 2025-05-13

## 2025-06-09 ENCOUNTER — OFFICE VISIT (OUTPATIENT)
Age: 70
End: 2025-06-09
Payer: COMMERCIAL

## 2025-06-09 VITALS
SYSTOLIC BLOOD PRESSURE: 110 MMHG | WEIGHT: 160.6 LBS | HEIGHT: 70 IN | HEART RATE: 66 BPM | BODY MASS INDEX: 22.99 KG/M2 | DIASTOLIC BLOOD PRESSURE: 62 MMHG

## 2025-06-09 DIAGNOSIS — E11.9 TYPE 2 DIABETES MELLITUS WITHOUT COMPLICATION, WITHOUT LONG-TERM CURRENT USE OF INSULIN (HCC): Primary | ICD-10-CM

## 2025-06-09 DIAGNOSIS — E78.2 MIXED HYPERLIPIDEMIA: ICD-10-CM

## 2025-06-09 LAB — HBA1C MFR BLD: 7.2 %

## 2025-06-09 PROCEDURE — 1123F ACP DISCUSS/DSCN MKR DOCD: CPT | Performed by: INTERNAL MEDICINE

## 2025-06-09 PROCEDURE — 3051F HG A1C>EQUAL 7.0%<8.0%: CPT | Performed by: INTERNAL MEDICINE

## 2025-06-09 PROCEDURE — 99214 OFFICE O/P EST MOD 30 MIN: CPT | Performed by: INTERNAL MEDICINE

## 2025-06-09 PROCEDURE — 83036 HEMOGLOBIN GLYCOSYLATED A1C: CPT | Performed by: INTERNAL MEDICINE

## 2025-06-09 RX ORDER — BLOOD SUGAR DIAGNOSTIC
STRIP MISCELLANEOUS
Qty: 100 EACH | Refills: 1 | Status: SHIPPED | OUTPATIENT
Start: 2025-06-09

## 2025-06-09 NOTE — PROGRESS NOTES
atorvastatin (LIPITOR) 10 MG tablet TAKE 1 TABLET EVERY DAY FOR CHOLESTEROL    blood glucose test strips (ASCENSIA AUTODISC VI;ONE TOUCH ULTRA TEST VI) strip Check blood sugar once daily. Dx: E11.9    blood glucose test strips (ONETOUCH ULTRA) strip Check blood sugar once daily. Dx: E11.9    glipiZIDE (GLUCOTROL) 5 MG tablet TAKE 1 TABLET TWICE A DAY FOR BLOOD SUGAR    cyanocobalamin 1000 MCG tablet Take 1 Tablet by mouth daily     No current facility-administered medications for this visit.     Allergies   Allergen Reactions    Codeine Other (See Comments)     Sweating     Review of Systems:  - Eyes: no blurry vision or double vision  - Cardiovascular: no chest pain  - Respiratory: no shortness of breath  - Musculoskeletal: no myalgias  - Neurological: no numbness/tingling in extremities    Physical Examination:  Blood pressure 110/62, pulse 66, height 1.778 m (5' 10\"), weight 72.8 kg (160 lb 9.6 oz).  General: pleasant, no distress, good eye contact   Neck: no carotid bruits  Cardiovascular: regular, normal rate, nl s1 and s2, no m/r/g, 2+ DP pulses   Respiratory: clear bilaterally  Integumentary: no edema, no foot ulcers,   Psychiatric: normal mood and affect    Diabetic foot exam:   Left Foot:   Visual Exam: normal   Pulse DP: 2+ (normal)   Filament test: normal sensation   Vibratory Sensation: normal  Right Foot:   Visual Exam: normal   Pulse DP: 2+ (normal)   Filament test: normal sensation   Vibratory Sensation: normal      Data Reviewed:   His A1C today was 7.2%    Assessment/Plan:   1) DM > His A1C today was 7.2%. He was on steroids in April 2025 and that would cause his BG and A1C to run higher.  Pt to continue the Jardiance 25mg every day, Metformin XR 1000mg with breakfast and 1000mg with dinner and Glipizide 5mg BID.   His BP is consistently at goal on no HTN medications.     2) HLD > Pt is tolerating his atorvastatin 10mg well. His Lipid panel was at goal in February 2024.      Pt voices understanding

## 2025-07-14 RX ORDER — GLIPIZIDE 5 MG/1
TABLET ORAL
Qty: 180 TABLET | Refills: 1 | Status: SHIPPED | OUTPATIENT
Start: 2025-07-14